# Patient Record
Sex: MALE | Race: BLACK OR AFRICAN AMERICAN | NOT HISPANIC OR LATINO | Employment: FULL TIME | URBAN - METROPOLITAN AREA
[De-identification: names, ages, dates, MRNs, and addresses within clinical notes are randomized per-mention and may not be internally consistent; named-entity substitution may affect disease eponyms.]

---

## 2024-02-22 ENCOUNTER — OFFICE VISIT (OUTPATIENT)
Dept: OTOLARYNGOLOGY | Facility: CLINIC | Age: 49
End: 2024-02-22
Payer: COMMERCIAL

## 2024-02-22 VITALS — BODY MASS INDEX: 33.05 KG/M2 | TEMPERATURE: 97.7 F | WEIGHT: 237 LBS

## 2024-02-22 DIAGNOSIS — R09.82 POST-NASAL DRAINAGE: ICD-10-CM

## 2024-02-22 DIAGNOSIS — R09.A2 GLOBUS SENSATION: ICD-10-CM

## 2024-02-22 DIAGNOSIS — K21.9 LARYNGOPHARYNGEAL REFLUX (LPR): Primary | ICD-10-CM

## 2024-02-22 PROCEDURE — 99204 OFFICE O/P NEW MOD 45 MIN: CPT | Performed by: NURSE PRACTITIONER

## 2024-02-22 PROCEDURE — 31505 DIAGNOSTIC LARYNGOSCOPY: CPT | Performed by: NURSE PRACTITIONER

## 2024-02-22 PROCEDURE — 1036F TOBACCO NON-USER: CPT | Performed by: NURSE PRACTITIONER

## 2024-02-22 ASSESSMENT — PATIENT HEALTH QUESTIONNAIRE - PHQ9
SUM OF ALL RESPONSES TO PHQ9 QUESTIONS 1 AND 2: 0
1. LITTLE INTEREST OR PLEASURE IN DOING THINGS: NOT AT ALL
2. FEELING DOWN, DEPRESSED OR HOPELESS: NOT AT ALL

## 2024-02-22 ASSESSMENT — ENCOUNTER SYMPTOMS
COUGH: 1
SORE THROAT: 1
ABDOMINAL PAIN: 0
TROUBLE SWALLOWING: 1

## 2024-02-23 ASSESSMENT — ENCOUNTER SYMPTOMS
SORE THROAT: 1
COUGH: 1
TROUBLE SWALLOWING: 1
ABDOMINAL PAIN: 0

## 2024-02-23 NOTE — PATIENT INSTRUCTIONS
Your flexible endoscopy showed that you have some swelling of the eustachian tubes and postnasal drainage.  There is also some evidence of reflux reaching up into the throat called LPR.  I put in a referral to gastroenterology for pH manometer.  Please follow-up in clinic in 8 weeks for review.

## 2024-02-23 NOTE — PROGRESS NOTES
Subjective   Patient ID: Meme Muniz is a 48 y.o. male who presents for Throat issues.  Sore Throat   This is a new problem. The current episode started 1 to 4 weeks ago. The problem has been unchanged. The pain is worse on the left side. There has been no fever. The fever has been present for 5 days or more. The pain is at a severity of 5/10. Associated symptoms include congestion, coughing, ear pain, a plugged ear sensation and trouble swallowing. Pertinent negatives include no abdominal pain. He has had no exposure to strep or mono.     Patient also states that he has had history of reflux in the past which treated with PPI and he responded well.  He has very good diet and knows what foods to avoid the triggers reflux.  He denies having any hemoptysis, trismus, dysphagia, odynophagia, unintended weight loss or weight gain.  He admits to having postnasal drainage that is bothersome as well as a globus sensation that is lingering with ear pressure.  No prior history of chronic ear infection, chronic rhinosinusitis,  ear surgery or trauma.  No family history of ear disease.  No complaints of tinnitus, dizziness or vertigo.    Review of Systems   HENT:  Positive for congestion, ear pain, sore throat and trouble swallowing.    Respiratory:  Positive for cough.    Gastrointestinal:  Negative for abdominal pain.       Objective   Physical Exam    CONSTITUTIONAL: No acute distress, normal facial features; No fever; no chills  VOICE: No hoarseness or other audible abnormality  RESPIRATION: Breathing comfortably, no stridor; normal breathing effort  CV: No cyanosis visible on the face and neck area  EYES:Pupils equal and round ; no erythema; conjunctiva clear; sclera white  NEURO: Alert and oriented, able to raise eyebrows symmetrical bilateral, smile with no facial droop, able to swallow  HEAD AND FACE: Symmetric facial features, no masses or lesions    Right ear examination: External ear normal.  EAC clear.  TM  intact with no effusion, retraction or perforation.  Left ear examination: External ear normal.  EAC clear.  TM intact with no effusion, retraction or perforation.    NOSE: External nose midline; inferior nasal turbinates mildly hypertrophied with clear nasal drainage no mucopus or polyps.  ORAL CAVITY: No lesions of external lips; uvula is midline; tongue with good mobility; no gross mass in oral cavity; mucosa appears pink ; no exudates, erythema or petechia of tonsils and oropharynx.  NECK/LYMPH: No obvious deformity or lesions; trachea is midline  PSYCH: Alert and oriented with appropriate mood and affect.    FOL  PROCEDURE NOTE:  Recommended flexible nasopharyngoscopy.  Risks, benefits, personnel and alternatives were explained.  The patient wished to proceed.  S/he was re-identified.  PROCEDURE:  Flexible nasopharyngoscopy  DIAGNOSIS: LPR, globus sensation and chronic sore throat    INDICATIONS: Inability to tolerate mirror exam  PROCEDURE NOTE:  Recommended flexible nasopharyngoscopy.  Risks, benefits, personnel and alternatives were explained.  The patient wished to proceed.  Patient was re-identified.  FINDINGS:  The nasopharynx and oropharynx were normal without any lesions or masses visualized.  No masses or lesions were visualized at the base of tongue, vallecula, epiglottis, aryepiglottic folds, pyriform sinuses, and lateral pharyngeal walls.  There is mild hypertrophy of the lingual tonsils with few cobblestoning of the posterior pharyngeal wall.  Noted torus tubarius bilaterally were inflamed but no obstruction of the eustachian tube meatus.  True vocal fold movement was normal no nodules or lesions.  The patient's airway was widely patent with no evidence of obstruction.  Patient tolerated the procedure well, and there were no complications.          Assessment/Plan       1. Laryngopharyngeal reflux (LPR)  Referral to Gastroenterology      2. Globus sensation        3. Post-nasal drainage           His clinical exam today showed mild to moderate LPR.  Per patient history he has been very good with his diet and nose what foods to avoid for reflux.  He did take PPI for short period of time which seem to help.  I put in a referral to gastroenterology for pH manometer to see if this patient can benefit for longer term treatment or we did just proceed with controlled diet and lifestyle management for reflux.    I did not see any evidence of infection in the throat or in the eustachian tube meatus.  He can benefit with using Flonase 2 sprays each nostril once daily for 8 weeks to help with the inflammation of the eustachian tubes.    He will follow-up with me in 2 months for reevaluation.    This note was created using speech recognition transcription software. Despite proofreading, several typographical errors might be present that might affect the meaning of the content. Please call with any questions.           NOVA Stein-CNP 02/23/24 11:17 AM

## 2024-03-12 ENCOUNTER — APPOINTMENT (OUTPATIENT)
Dept: GASTROENTEROLOGY | Facility: HOSPITAL | Age: 49
End: 2024-03-12
Payer: COMMERCIAL

## 2024-04-09 ENCOUNTER — APPOINTMENT (OUTPATIENT)
Dept: PRIMARY CARE | Facility: CLINIC | Age: 49
End: 2024-04-09
Payer: COMMERCIAL

## 2024-04-11 NOTE — PROGRESS NOTES
Meme Muniz is a 49 y.o. male with past medical history of sleep apnea, GERD, and cholecystectomy who is referred by [facility] for reflux. About 4 months ago he had possible virus, with pressure in head and ears and since then has had burning in chest and throat. This would come and go and eventually went away however his throat feels dry and his ear pressure has persisted. His typical GERD symptoms have overall been well controlled. Saw ENT who stated he may have silent reflux.     No nausea, vomiting, abdominal pain, poor appetite, or early satiety. He has been taking pantoprazole for 2 months with no improvement. There has been no change in bowels. He has daily bowel movements. No rectal bleeding, hematochezia, or melena. He has had 7 pound intentional weight loss with diet and exercise.    CBC from 6/2023 shows a microcytic anemia with Hgb 12 (MCV 70). Repeat labs 3/2024 are pretty much the same at Hgb 12.1, MCV 71. Iron normal. 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 He does not take NSAIDS regularly. No prior EGD. He had colonoscopy over 10 years ago. Had some polyps removed but told they were benign. Surgical history includes cholecystectomy.    Family history: Denies family history of colon cancer or other GI disorders or malignancy. Father had to see Hematologist as well for factor 7.     Past Medical History:   Diagnosis Date    Abnormal findings on diagnostic imaging of other specified body structures     Abnormal CT scan    Localized swelling, mass and lump, neck 11/08/2016    Mass of left side of neck    Personal history of other diseases of the digestive system 09/30/2015    History of acute cholecystitis    Personal history of other diseases of the digestive system 10/31/2016    History of gastroesophageal reflux (GERD)    Personal history of other diseases of the digestive system 11/30/2015    History of chronic cholecystitis     Past Surgical History:  "  Procedure Laterality Date    CHOLECYSTECTOMY  01/28/2016    Cholecystectomy Laparoscopic    EXCISION TUMOR NECK / THORAX  2018    mass excised from right side of neck     Current Outpatient Medications   Medication Sig Dispense Refill    cyclobenzaprine (Flexeril) 5 mg tablet Take 1 tablet (5 mg) by mouth 4 times a day as needed.      pantoprazole (ProtoNix) 40 mg EC tablet Take 1 tablet (40 mg) by mouth once daily.      polyethylene glycol-electrolytes (polyethylene glycol) 420 gram solution use as directed prior to colonoscopy      polyethylene glycol-electrolytes (GaviLyte-G) 420 gram solution Take 4,000 mL by mouth 1 time for 1 dose. 4000 mL 0     No current facility-administered medications for this visit.     No Known Allergies    Family History   Problem Relation Name Age of Onset    Heart attack Father         Review of Systems  Review of Systems negative except as noted in HPI.    Objective     /80   Pulse 84   Temp 36.5 °C (97.7 °F)   Resp 20   Ht 1.803 m (5' 11\")   Wt 103 kg (226 lb 14.4 oz)   SpO2 99%   BMI 31.65 kg/m²      Physical Exam  Constitutional:  No acute distress. Normal appearance. Not ill-appearing.  HENT:  Head normocephalic and atraumatic. Conjunctivae normal.  Cardiovascular:  Normal rate. Regular rhythm.  Pulmonary:  Pulmonary effort normal. No respiratory distress. Breath sounds clear.  Abdominal:  Abdomen is flat and soft. There is no distension. No tenderness or guarding.  Skin: Dry.  Neurological:  Alert and oriented.  Psychiatric:  Mood and affect normal.    Assessment/Plan     49 y.o. male with history of sleep apnea, GERD, and cholecystectomy who presents today for initial clinic visit for burning in chest/throat. This improved with PPI however he has a persistent microcytic anemia. 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 His anemia may be due to this however for completeness we discussed proceeding with EGD and " colonoscopy, as it has been 10 years or more since last colon cancer screening. Procedures ordered with anesthesia due to hx of sleep apnea.    Recommendations  Schedule EGD and colonoscopy. Golytely bowel prep sent to pharmacy.  Consider seeing Hematology for anemia.  Follow up to be determined after procedures.     Electronically signed by: Babita Roger CNP on 4/15/2024 at 3:15 PM

## 2024-04-15 ENCOUNTER — OFFICE VISIT (OUTPATIENT)
Dept: GASTROENTEROLOGY | Facility: HOSPITAL | Age: 49
End: 2024-04-15
Payer: COMMERCIAL

## 2024-04-15 VITALS
HEIGHT: 71 IN | SYSTOLIC BLOOD PRESSURE: 129 MMHG | RESPIRATION RATE: 20 BRPM | TEMPERATURE: 97.7 F | WEIGHT: 226.9 LBS | OXYGEN SATURATION: 99 % | DIASTOLIC BLOOD PRESSURE: 80 MMHG | BODY MASS INDEX: 31.77 KG/M2 | HEART RATE: 84 BPM

## 2024-04-15 DIAGNOSIS — K21.9 LARYNGOPHARYNGEAL REFLUX (LPR): Primary | ICD-10-CM

## 2024-04-15 DIAGNOSIS — D64.9 ANEMIA, UNSPECIFIED TYPE: ICD-10-CM

## 2024-04-15 DIAGNOSIS — Z86.010 HISTORY OF COLON POLYPS: ICD-10-CM

## 2024-04-15 PROCEDURE — 99204 OFFICE O/P NEW MOD 45 MIN: CPT | Performed by: NURSE PRACTITIONER

## 2024-04-15 PROCEDURE — 99214 OFFICE O/P EST MOD 30 MIN: CPT | Performed by: NURSE PRACTITIONER

## 2024-04-15 PROCEDURE — 1036F TOBACCO NON-USER: CPT | Performed by: NURSE PRACTITIONER

## 2024-04-15 RX ORDER — CYCLOBENZAPRINE HCL 5 MG
5 TABLET ORAL 4 TIMES DAILY PRN
COMMUNITY
Start: 2024-04-05

## 2024-04-15 RX ORDER — PANTOPRAZOLE SODIUM 40 MG/1
40 TABLET, DELAYED RELEASE ORAL DAILY
COMMUNITY
Start: 2024-04-05

## 2024-04-15 RX ORDER — POLYETHYLENE GLYCOL 3350, SODIUM SULFATE ANHYDROUS, SODIUM BICARBONATE, SODIUM CHLORIDE, POTASSIUM CHLORIDE 236; 22.74; 6.74; 5.86; 2.97 G/4L; G/4L; G/4L; G/4L; G/4L
POWDER, FOR SOLUTION ORAL
COMMUNITY
Start: 2024-03-07 | End: 2024-06-05

## 2024-04-15 RX ORDER — POLYETHYLENE GLYCOL-3350 AND ELECTROLYTES 236; 6.74; 5.86; 2.97; 22.74 G/274.31G; G/274.31G; G/274.31G; G/274.31G; G/274.31G
4000 POWDER, FOR SOLUTION ORAL ONCE
Qty: 4000 ML | Refills: 0 | Status: SHIPPED | OUTPATIENT
Start: 2024-04-15 | End: 2024-04-15

## 2024-04-15 ASSESSMENT — LIFESTYLE VARIABLES
HOW MANY STANDARD DRINKS CONTAINING ALCOHOL DO YOU HAVE ON A TYPICAL DAY: PATIENT DOES NOT DRINK
AUDIT-C TOTAL SCORE: 0
HOW OFTEN DO YOU HAVE SIX OR MORE DRINKS ON ONE OCCASION: NEVER
HOW OFTEN DO YOU HAVE A DRINK CONTAINING ALCOHOL: NEVER
SKIP TO QUESTIONS 9-10: 1

## 2024-04-15 ASSESSMENT — PAIN SCALES - GENERAL: PAINLEVEL: 0-NO PAIN

## 2024-04-15 NOTE — PATIENT INSTRUCTIONS
I suspect your anemia is due to your underlying factor 7 disorder, however due to your symptoms and history of polyps we should proceed with EGD and colonoscopy for completeness. I also recommend you follow up with Hematology.    Recommendations  Schedule EGD (upper endoscopy) and colonoscopy. You can call [phone] to schedule. Golytely bowel prep sent to pharmacy.  Consider seeing Hematology again for anemia.  Follow up to be determined after procedures. Please call the office at [phone] with any questions or concerns.

## 2024-04-25 NOTE — PROGRESS NOTES
"       [facility]  [facility]  [address]  [facility] [id number] [phone]     [facility]  New Visit Note      Subjective   Patient ID: Meme Muniz is a 49 y.o. male with past medical history significant for ****.     2024: The patient is here {pxarrival:06092}  for comprehensive sleep medicine evaluation due to {SleepCC:64872}. ESS: XIMENA:  , and neck circumference is  inches  today.    HPI  {OSAhx:91932}      SLEEP STUDY HISTORY: (personally reviewed raw data such as interpretation report, data sheet, hypnogram, and titration table if available and applicable)  ***    SLEEP-WAKE SCHEDULE  Bedtime: *** on weekdays, *** on weekends  Subjective sleep latency: ***  Problems falling asleep: ***  Number of awakenings: *** times per night spontaneously for unknown reasons  Falls back asleep in ***  Problems staying asleep: ***  Final wake time: *** on weekdays, *** on weekends  Out of bed time: *** on weekdays, *** on weekends  Shift work: ***  Naps: ***  Feels rested after a nap: {Yes/No:86984}  Average sleep duration (excluding naps): ***    SLEEP ENVIRONMENT  Sleep location: ***  Sleep status: {sleep status:31533}  Room is dark:  {Yes/No:79673::\"Yes\"}  Room is quiet: {Yes/No:22423::\"Yes\"}  Room is cool: {Yes/No:04261::\"Yes\"}  Bed comfort: good    SLEEP HABITS:   Activities before bedtime: ***  Activities in bed: ***  Preferred sleep position: {Sleep position:62337}    SLEEP ROS:  Night symptoms: {sleep apnea ROS at night:72384}  Morning symptoms: {sleep apnea ROS in mornin}  Daytime symptoms {sleep apnea ROS at daytime:44733}  Hypersomnia / narcolepsy symptoms: {narcolepsy ROS:39319}  RLS symptoms: {RLS symptoms:84369}  Movements in sleep: {sleep movements:53357}  Parasomnia symptoms: {parasomnia ROS:41647}    WEIGHT: {weight:00053}    REVIEW OF SYSTEMS: All other systems have been reviewed " and are negative.    PERTINENT SOCIAL HISTORY:  Occupation: employment status:86200}  Smoking: {Yes/No:94761}  ETOH: {Yes/No:63818}  Marijuana: {Yes/No:59786}  Caffeine: ***  Sleep aids: {Yes/No:54484}  Claustrophobia: {Yes/No:31071}    PERTINENT PAST SURGICAL HISTORY:  {surgical history pertinent in sleep:74165}    PERTINENT FAMILY HISTORY:  {family history in sleep:96665}    Active Problems, Allergy List, Medication List, and PMH/PSH/FH/Social Hx have been reviewed and reconciled in chart. No significant changes unless documented in the pertinent chart section. Updates made when necessary.       Objective     Physical Exam  Constitutional:alert and oriented to time, place, and person  Sinus: *** tenderness to palpation  Palate: Normal / Narrow / High-arched / *** torus palatini  Mallampati ***, *** Tongue scalloping, *** macroglossia  Lungs: Clear to auscultation bilateral, no rales  Heart: Regular rate and rhythm, no murmurs    Assessment/Plan   Meme Muniz is a 49 y.o. male who is seen to evaluate for ***possible/severe/ moderate/mild obstructive sleep apnea. The pathophysiology of sleep apnea, diagnostic testing (HST vs PSG), treatment options (PAP, oral appliance, surgery, hypoglossal nerve stimulator called Inspire), and supportive management (weight loss, positional therapy, smoking cessation, avoidance of alcohol and sedatives) were discussed with the patient in detail. Risk factors of sleep apnea as well as cardiometabolic and neurocognitive sequelae associated with untreated sleep apnea were also discussed. Lastly, patient was advised to avoid driving vehicle or operating heavy machinery when sleepy.     Meme Muniz with the following problems:     # SLEEP DISORDERED BREATHING:  -This is likely sleep apnea based on the the history and physical examination.   -Meme Abreus not yet had a sleep study.  -Instruct patient to complete home/ in lab/ split night/ titration sleep study.  -HSAT is  reasonable as patient likely has YAIMA based on history and exam and does not have any of the following comorbidities: CHF, neuromuscular weakness, hypoventilation, or significant COPD.  -We consider treatment as indicated when testing is complete.     # SLEEP APNEA:  -Per CMS criteria, the patient is not eligible for a pap to treat her Obstructive sleep apnea.   -Start/ Continue [] cmH20 []PAP through [employer].  -Sleep apnea and PAP therapy education were provided at length in the clinic today. Meme Muniz verbalized understanding.  -Emphasized diet, exercise, and weight loss in the clinic, as were non-supine sleep, avoiding alcohol in the late evening, and driving or operating heavy machinery when sleepy.  Meme Munizverbalizes understanding of the above instructions and risks.    # BARIATRIC SURGERY INSTRUCTION:  -Please continuous using your CPAP to treat your sleep apnea.  -Bring your PAP and all equipment with you to surgery.  -Use your PAP with surgery and during recovery.  -Keep your head of the bed at 30* or higher.  -I advise judicious use of pain medications post operatively as pain medications can make sleep apnea worse.    -I advise close monitoring of the patient post operatively due to increased risk of complications related to sleep apnea.   -Meme Muniz may be at higher risk of postoperative respiratory complications.Valentinealexei Muniz understands the risk of post operative complications are higher for Meme Muniz is at increased but not prohibitive risk due to YAIMA.   -Meme Muniz is optimized on PAP therapy for sleep apnea as long as Meme Muniz is compliant with PAP use per most recent download.  -Meme Muniz verbalizes understanding of the above instructions and risks.     # CHRONIC SLEEP ONSET/ SLEEP MAINTENANCE INSOMNIA:  -likely due to poor sleep hygiene, irregular sleep schedule, depression, anxiety, untreated sleep apnea, nocturia, RLS,  delayed sleep phase syndrome, and chronic pain. [Meds] may be a contributing factor as well.  -XIMENA:    # DEPRESSION and ANXIETY:   -Meme Munizis taking [] and participating in psychotherapy.  -Denies HI/SI     # HYPERTENSION/ ATRIAL FIBRILLATION/ CAD/ CHF:  -Blood pressure was controlled today. To control her blood pressure better, instruct the patient to take anti-hypertension medication at night and a water pill in the morning.  -Denies headache, palpitation, and syncope in the clinic.  -Last Echo:  -Follows with PCP/ Cardiology     # MORBID OBESITY/OBESITY /OVERWEIGHT:  -with a BMI of []. Meme Muniz most recent Bicarbonate was   Bicarbonate   Date Value Ref Range Status   01/07/2022 24 21 - 32 mmol/L Final      -Encourage to have regular exercise to manage weight well.  -Refer to a nutritionist for weight control.  -Bariatric surgery candidate.    # NASAL CONGESTION:  -Instruct Meme Maier use appropriate Flonase spray to ease congestion.  -Refer to Otolaryngology for underlying investigation.    # XEROSTOMIA:  -Instruct Meme Maier purchase the Biotene gel to ease the dry mouth symptom,     # TOBACCO ABUSE:Meme Muniz is a current [] PPD smoker for [] years.  -Smoking Cessation given  -Decline Smoking Cessation     # RESTLESS LEG SYNDROME: This occurs frequently.  Iron (ug/dL)   Date Value   12/03/2021 44     Iron Saturation (%)   Date Value   12/03/2021 21 (L)     TIBC (ug/dL)   Date Value   12/03/2021 210 (L)     Ferritin (ug/L)   Date Value   12/03/2021 2,608 (H)     We will check a ferritin level today and start OTC iron replacement to ferritin of >75 as indicated.  Caffeine []. Eliminate  Tobacco []. Smoking cessation counseling provided.  Meme Darrius I discussed Meme Munizcurrent medications that could be exacerbating Meme Muniz symptoms. Will continue [] for now.  Associated diseases [] and response  [].  Pramipexole  Ropinirole  Rotigitine  Gabapentin  Pregabalin  Opioids  Benzos     # CIRCADIAN RHYTHM SLEEP-WAKE DISORDER:  -We will obtain sleep logs for two weeks to be brought to next clinic to discuss. We will consider actigraphy to compare and/or confirm sleep log findings.  Delayed Sleep Phase:   -Set wake time, light therapy in the morning.   -Sleep hygiene measures at set bedtime.  Advanced Sleep Phase:   -Set bedtime and light therapy in the evening.   -Sleep hygiene measures at set bedtime.  Irregular Sleep Phase:  -Set bedtime and wake time.   -Daytime routine including scheduled physical activity, social activity and meal times.  Non-24 Sleep-Wake Rhythm:  -Set bedtime and wake time.   -Daytime routine including scheduled physical activity, social activity and meal times.  Shift Work:  -Maximize sleep time to 7-8 hours.   -Make sure your room is dark, quiet, cool and interruptions are eliminated.   -Avoid light in the mornings, wear dark sunglasses driving home.   -Expose yourself to bright light or daylight upon waking.   -Avoid caffeine 8 hours prior to sleeping.   -We will consider modafinil, a mild stimulant, if these strategies are not effective.  Jet Lag:  -Try to change your sleep/wake schedule two to three days prior to travel.   -Expose yourself to bright light when you want to be awake.   -Avoid bright light and electronic light when you are nearing time to sleep.   -You can take melatonin OTC as needed 1 hour prior to bedtime as needed.     # SLEEPWALKING/ SLEEP EATING/ REM BEHAVIOR DISORDER:  -Instruct Meme Muniz to make sure self and bed partner are safe.  -Instruct Meme Maier lock bedroom doors and windows.  -Instruct Meme Maier hide his/her car keys.  -Instruct Meme Maier pad headboard and move furniture away from the bed.  -Instruct Meme Muniz to put pillows in between him/her with bed partner if kicking or hitting. Consider sleeping  separately.  -Instruct Meme Muniz to remove clutter and furniture from bedroom floor to avoid injury. Consider moving Meme Muniz mattress to the floor.  -Instruct Meme Muniz to advise bed partner to calmly lead you back to bed with a gentle voice. Avoid touching and grabbing.  -Instruct Meme Muniz to  find evidence of sleep eating, especially if he/she is gaining weight, consider locking Meme Muniz fridge and pantry at night.  -We will consider a trial of low dose clonazepam 0.5 mg nightly.     #IDIOPATHIC HYPERSOMNIA VS. NARCOLEPSY:  - Will order an overnight sleep study, followed by MSLT the next day  - Perform urine toxicology prior to sleep study.  - Will call patient within 3 weeks after the test. Will discuss follow up plan at that time.  - May consider checking TSH and iron studies to rule out underlying metabolic etiologies.  -Scheduled naps  -Consolidate night time sleep.     *An OARRS report was reviewed and is consistent with prescribed medications.   *Considered the risks of abuse, dependence, addiction, and diversion and [] medication is felt to be clinically appropriate based on documented diagnosis.  *A controlled substance agreement was reviewed and signed today in clinic.   *Pending scanned copy in to the chart per office staff.    RTC      All of patient's questions were answered. He verbalizes understanding and agreement with my assessment and plan.

## 2024-05-01 ENCOUNTER — APPOINTMENT (OUTPATIENT)
Dept: SLEEP MEDICINE | Facility: HOSPITAL | Age: 49
End: 2024-05-01
Payer: COMMERCIAL

## 2024-05-23 ENCOUNTER — APPOINTMENT (OUTPATIENT)
Dept: PRIMARY CARE | Facility: CLINIC | Age: 49
End: 2024-05-23
Payer: COMMERCIAL

## 2024-05-23 ENCOUNTER — APPOINTMENT (OUTPATIENT)
Dept: OTOLARYNGOLOGY | Facility: CLINIC | Age: 49
End: 2024-05-23
Payer: COMMERCIAL

## 2024-06-06 ENCOUNTER — OFFICE VISIT (OUTPATIENT)
Dept: PRIMARY CARE | Facility: CLINIC | Age: 49
End: 2024-06-06
Payer: COMMERCIAL

## 2024-06-06 ENCOUNTER — LAB (OUTPATIENT)
Dept: LAB | Facility: LAB | Age: 49
End: 2024-06-06
Payer: COMMERCIAL

## 2024-06-06 VITALS — SYSTOLIC BLOOD PRESSURE: 110 MMHG | DIASTOLIC BLOOD PRESSURE: 84 MMHG | HEART RATE: 94 BPM

## 2024-06-06 DIAGNOSIS — R20.2 PARESTHESIAS: ICD-10-CM

## 2024-06-06 DIAGNOSIS — D56.3 ALPHA THALASSEMIA MINOR: ICD-10-CM

## 2024-06-06 DIAGNOSIS — R20.2 PARESTHESIAS: Primary | ICD-10-CM

## 2024-06-06 DIAGNOSIS — G47.33 OSA (OBSTRUCTIVE SLEEP APNEA): ICD-10-CM

## 2024-06-06 DIAGNOSIS — R73.03 PREDIABETES: ICD-10-CM

## 2024-06-06 DIAGNOSIS — K21.9 GASTROESOPHAGEAL REFLUX DISEASE WITHOUT ESOPHAGITIS: ICD-10-CM

## 2024-06-06 DIAGNOSIS — I10 ESSENTIAL HYPERTENSION: ICD-10-CM

## 2024-06-06 PROBLEM — K63.5 COLON POLYP: Status: ACTIVE | Noted: 2024-03-07

## 2024-06-06 LAB
25(OH)D3 SERPL-MCNC: 55 NG/ML (ref 30–100)
ALBUMIN SERPL BCP-MCNC: 4.6 G/DL (ref 3.4–5)
ALP SERPL-CCNC: 91 U/L (ref 33–120)
ALT SERPL W P-5'-P-CCNC: 19 U/L (ref 10–52)
ANION GAP SERPL CALC-SCNC: 15 MMOL/L (ref 10–20)
AST SERPL W P-5'-P-CCNC: 18 U/L (ref 9–39)
BASOPHILS # BLD AUTO: 0.02 X10*3/UL (ref 0–0.1)
BASOPHILS NFR BLD AUTO: 0.4 %
BILIRUB SERPL-MCNC: 0.6 MG/DL (ref 0–1.2)
BUN SERPL-MCNC: 17 MG/DL (ref 6–23)
CALCIUM SERPL-MCNC: 9.3 MG/DL (ref 8.6–10.6)
CHLORIDE SERPL-SCNC: 104 MMOL/L (ref 98–107)
CHOLEST SERPL-MCNC: 193 MG/DL (ref 0–199)
CHOLESTEROL/HDL RATIO: 4.5
CO2 SERPL-SCNC: 23 MMOL/L (ref 21–32)
CREAT SERPL-MCNC: 1.25 MG/DL (ref 0.5–1.3)
CRP SERPL HS-MCNC: 3.1 MG/L
EGFRCR SERPLBLD CKD-EPI 2021: 71 ML/MIN/1.73M*2
EOSINOPHIL # BLD AUTO: 0.02 X10*3/UL (ref 0–0.7)
EOSINOPHIL NFR BLD AUTO: 0.4 %
ERYTHROCYTE [DISTWIDTH] IN BLOOD BY AUTOMATED COUNT: 17.5 % (ref 11.5–14.5)
EST. AVERAGE GLUCOSE BLD GHB EST-MCNC: 128 MG/DL
GLUCOSE SERPL-MCNC: 99 MG/DL (ref 74–99)
HBA1C MFR BLD: 6.1 %
HCT VFR BLD AUTO: 40.4 % (ref 41–52)
HDLC SERPL-MCNC: 43.3 MG/DL
HGB BLD-MCNC: 13 G/DL (ref 13.5–17.5)
IMM GRANULOCYTES # BLD AUTO: 0.01 X10*3/UL (ref 0–0.7)
IMM GRANULOCYTES NFR BLD AUTO: 0.2 % (ref 0–0.9)
LDLC SERPL CALC-MCNC: 132 MG/DL
LYMPHOCYTES # BLD AUTO: 2.42 X10*3/UL (ref 1.2–4.8)
LYMPHOCYTES NFR BLD AUTO: 53 %
MCH RBC QN AUTO: 22.5 PG (ref 26–34)
MCHC RBC AUTO-ENTMCNC: 32.2 G/DL (ref 32–36)
MCV RBC AUTO: 70 FL (ref 80–100)
MONOCYTES # BLD AUTO: 0.4 X10*3/UL (ref 0.1–1)
MONOCYTES NFR BLD AUTO: 8.8 %
NEUTROPHILS # BLD AUTO: 1.7 X10*3/UL (ref 1.2–7.7)
NEUTROPHILS NFR BLD AUTO: 37.2 %
NON HDL CHOLESTEROL: 150 MG/DL (ref 0–149)
NRBC BLD-RTO: 0 /100 WBCS (ref 0–0)
PLATELET # BLD AUTO: 275 X10*3/UL (ref 150–450)
POTASSIUM SERPL-SCNC: 4.2 MMOL/L (ref 3.5–5.3)
PROT SERPL-MCNC: 7.5 G/DL (ref 6.4–8.2)
PROT SERPL-MCNC: 7.5 G/DL (ref 6.4–8.2)
RBC # BLD AUTO: 5.79 X10*6/UL (ref 4.5–5.9)
SODIUM SERPL-SCNC: 138 MMOL/L (ref 136–145)
TRIGL SERPL-MCNC: 87 MG/DL (ref 0–149)
TSH SERPL-ACNC: 1.52 MIU/L (ref 0.44–3.98)
VIT B12 SERPL-MCNC: 1312 PG/ML (ref 211–911)
VLDL: 17 MG/DL (ref 0–40)
WBC # BLD AUTO: 4.6 X10*3/UL (ref 4.4–11.3)

## 2024-06-06 PROCEDURE — 83036 HEMOGLOBIN GLYCOSYLATED A1C: CPT

## 2024-06-06 PROCEDURE — 80061 LIPID PANEL: CPT

## 2024-06-06 PROCEDURE — 86141 C-REACTIVE PROTEIN HS: CPT

## 2024-06-06 PROCEDURE — 3074F SYST BP LT 130 MM HG: CPT | Performed by: INTERNAL MEDICINE

## 2024-06-06 PROCEDURE — 99203 OFFICE O/P NEW LOW 30 MIN: CPT | Performed by: INTERNAL MEDICINE

## 2024-06-06 PROCEDURE — 80053 COMPREHEN METABOLIC PANEL: CPT

## 2024-06-06 PROCEDURE — 36415 COLL VENOUS BLD VENIPUNCTURE: CPT

## 2024-06-06 PROCEDURE — 84443 ASSAY THYROID STIM HORMONE: CPT

## 2024-06-06 PROCEDURE — 3079F DIAST BP 80-89 MM HG: CPT | Performed by: INTERNAL MEDICINE

## 2024-06-06 PROCEDURE — 85025 COMPLETE CBC W/AUTO DIFF WBC: CPT

## 2024-06-06 PROCEDURE — 84155 ASSAY OF PROTEIN SERUM: CPT

## 2024-06-06 PROCEDURE — 82306 VITAMIN D 25 HYDROXY: CPT

## 2024-06-06 PROCEDURE — 84165 PROTEIN E-PHORESIS SERUM: CPT

## 2024-06-06 PROCEDURE — 82607 VITAMIN B-12: CPT

## 2024-06-06 NOTE — PROGRESS NOTES
Subjective   Patient ID: Meme Muniz is a 49 y.o. male who presents to establish PCP    HPI   The patient reports a 3-week history of constant burning sensation originating in the toes extending the proximal ends of the lower legs bilaterally.  He reports similar constant burning sensation at the distal ends of the fingers as well.  He reports that he has experienced intermittent episodes of a burning sensation in both buttocks and over the neck.  He reports no associated neck pain, lower back pain, upper or lower extremity weakness.    The patient's past medical history is remarkable for hypertension  COVID-19 x 2 the first episode requiring a hospitalization in November 2021  Allergic rhinitis  Gastroesophageal reflux  Status postcholecystectomy  Vitamin D deficiency  Prediabetes  Thalassemia  Cervical spinal stenosis at C4-C5  Obstructive sleep apnea  Review of Systems    Objective   There were no vitals taken for this visit.    Physical Exam  Neurologic  Mental status-alert and oriented x3   Cranial nerves-2 through 12 grossly intact, no visual field abnormalities  Motor-no pronator drift noted, strength-5/5 in all muscle groups tested, , no tremor noted.  No bradykinesia noted.  No rigidity noted.  Negative pull test  Sensory-Light touch sensation fully intact  Pinprick sensation fully intact  Vibratory sensation fully intact  Cerebellar-no truncal ataxia, good coordination finger-nose testing,, good coordination heel-to-shin testing, normal rapid alternating movements  Romberg negative, no abnormality in tandem gait  Reflexes-1+/4 bilaterally  Assessment/Plan        Assessment  Constant burning sensation originating in the toes extending to the proximal ends of the lower legs bilaterally and at the distal ends of the fingers-May be secondary to peripheral neuropathy-probably secondary to type 2 diabetes mellitus.  Bilateral cervical radiculopathies, bilateral lumbar radiculopathies are possible etiologies  as well but are much less likely  Plan  Obtain CBC differential, CMP, fasting lipid profile, hemoglobin A1c, TSH, vitamin D level, vitamin B12 level, cardiac CRP, serum immunoelectrophoresis today.  Arrange for EMG of the upper and lower extremities as soon as possible  At this time, the patient does not wish to begin use of a pain modulator such as duloxetine, alpha lipoic acid

## 2024-06-10 LAB
ALBUMIN: 4.5 G/DL (ref 3.4–5)
ALPHA 1 GLOBULIN: 0.2 G/DL (ref 0.2–0.6)
ALPHA 2 GLOBULIN: 0.5 G/DL (ref 0.4–1.1)
BETA GLOBULIN: 1 G/DL (ref 0.5–1.2)
GAMMA GLOBULIN: 1.3 G/DL (ref 0.5–1.4)
PATH REVIEW-SERUM PROTEIN ELECTROPHORESIS: NORMAL
PROTEIN ELECTROPHORESIS COMMENT: NORMAL

## 2024-06-24 ENCOUNTER — APPOINTMENT (OUTPATIENT)
Dept: PRIMARY CARE | Facility: CLINIC | Age: 49
End: 2024-06-24
Payer: COMMERCIAL

## 2024-06-24 ENCOUNTER — HOSPITAL ENCOUNTER (OUTPATIENT)
Dept: NEUROLOGY | Facility: CLINIC | Age: 49
Discharge: HOME | End: 2024-06-24
Payer: COMMERCIAL

## 2024-06-24 DIAGNOSIS — R20.2 PARESTHESIAS: ICD-10-CM

## 2024-06-24 PROCEDURE — 95886 MUSC TEST DONE W/N TEST COMP: CPT | Performed by: PSYCHIATRY & NEUROLOGY

## 2024-06-24 PROCEDURE — 95912 NRV CNDJ TEST 11-12 STUDIES: CPT | Performed by: PSYCHIATRY & NEUROLOGY

## 2024-08-14 ENCOUNTER — TELEPHONE (OUTPATIENT)
Dept: PRIMARY CARE | Facility: CLINIC | Age: 49
End: 2024-08-14
Payer: COMMERCIAL

## 2024-08-14 NOTE — TELEPHONE ENCOUNTER
Left message on Cookie's voicemail to give me a call back to clarify as she did state patient has an upcoming appointment. Also left our fax number on her voicemail just in case she didn't have the correct fax number and needs to refax referral.

## 2024-08-19 DIAGNOSIS — R20.2 PARESTHESIAS: Primary | ICD-10-CM

## 2024-08-19 RX ORDER — DULOXETIN HYDROCHLORIDE 30 MG/1
30 CAPSULE, DELAYED RELEASE ORAL DAILY
Qty: 30 CAPSULE | Refills: 11 | Status: SHIPPED | OUTPATIENT
Start: 2024-08-19 | End: 2025-08-19

## 2024-08-19 NOTE — PROGRESS NOTES
Patient continues to complain of diffuse burning in the arms and legs.  I have again refer the patient to neurology for further evaluation and treatment but I have prescribed duloxetine at a dose of 30 mg daily.  He will contact me in 3 weeks with his condition

## 2024-11-20 ENCOUNTER — OFFICE VISIT (OUTPATIENT)
Dept: NEUROLOGY | Facility: HOSPITAL | Age: 49
End: 2024-11-20
Payer: COMMERCIAL

## 2024-11-20 VITALS
DIASTOLIC BLOOD PRESSURE: 116 MMHG | WEIGHT: 233 LBS | RESPIRATION RATE: 18 BRPM | BODY MASS INDEX: 32.62 KG/M2 | SYSTOLIC BLOOD PRESSURE: 163 MMHG | HEIGHT: 71 IN | TEMPERATURE: 97.3 F | HEART RATE: 130 BPM

## 2024-11-20 DIAGNOSIS — G62.9 SMALL FIBER NEUROPATHY: Primary | ICD-10-CM

## 2024-11-20 DIAGNOSIS — R20.2 PARESTHESIAS: ICD-10-CM

## 2024-11-20 PROCEDURE — 3077F SYST BP >= 140 MM HG: CPT | Performed by: PSYCHIATRY & NEUROLOGY

## 2024-11-20 PROCEDURE — 99215 OFFICE O/P EST HI 40 MIN: CPT | Mod: GC | Performed by: PSYCHIATRY & NEUROLOGY

## 2024-11-20 PROCEDURE — 3008F BODY MASS INDEX DOCD: CPT | Performed by: PSYCHIATRY & NEUROLOGY

## 2024-11-20 PROCEDURE — 99215 OFFICE O/P EST HI 40 MIN: CPT | Performed by: PSYCHIATRY & NEUROLOGY

## 2024-11-20 PROCEDURE — 3080F DIAST BP >= 90 MM HG: CPT | Performed by: PSYCHIATRY & NEUROLOGY

## 2024-11-20 RX ORDER — GABAPENTIN 100 MG/1
100 CAPSULE ORAL 3 TIMES DAILY
Qty: 90 CAPSULE | Refills: 11 | Status: SHIPPED | OUTPATIENT
Start: 2024-11-20 | End: 2025-11-20

## 2024-11-20 ASSESSMENT — PATIENT HEALTH QUESTIONNAIRE - PHQ9
1. LITTLE INTEREST OR PLEASURE IN DOING THINGS: NOT AT ALL
SUM OF ALL RESPONSES TO PHQ9 QUESTIONS 1 AND 2: 0
2. FEELING DOWN, DEPRESSED OR HOPELESS: NOT AT ALL

## 2024-11-20 ASSESSMENT — PAIN SCALES - GENERAL: PAINLEVEL_OUTOF10: 7

## 2024-11-20 NOTE — PROGRESS NOTES
Neuromuscular Medicine Consult     Meme Muniz, MRN: [id number], : 1975  Reason for Referral: No chief complaint on file.     Referring Provider: Neftaly Vanessa MD  Primary Care Physician: Neftaly Vanessa MD     Impression/Plan:   Meme Muniz is a 50 yo male with recently found prediabetes, who presents with 5 months of ascending burning and paresthesia from feet up to neck. The abnormal sensation is persistent in his legs below the knees as well as the neck, and intermittent in the rest of the body. Yesterday he noticed frostbite sensation in the right side of his face which went away. Around the time when the burning started, he noticed erectile dysfunction. No other symptoms suggesting dysautonomia. No significant sicca symptoms. He does not drink alcohol regularly, does not smoke, no known toxic exposure, no known cardiac issue.     His current exam is only positive for decreased temperature sensation in left hand in a glove distribution, and decreased temperature sensation in bilateral feet below ankles. The rest of the exam findings are normal. His presentation and exam findings can best localize to small fibers in the peripheral nervous system.     Possible causes of small fiber neuropathy include diabetes/glucose intolerance, vitamin excess (eg, B6) or deficiency (eg, B12), thyroid dysfunction, infection (eg, HIV, HBV, syphillis), autoimmune/inflammatory conditions (eg, sjogren's disease, sarcoidosis, etc), celiac disease, paraneoplastic conditions (eg, anti Hu), amyloidosis, cryoglobulinemia. Genetic conditions would be less likely in his case.  His current testing showed good B12 level and normal TSH, A1c is mildly elevated (6.1). His prior CRP was elevated but not sure in what kind of health condition. His prior EMG showed no large fiber neuropathy, which would be within our expectation in cases of small fiber neuropathy. His prior MRI cervical spine at [facility] showed some degenerative changes  with C3-C4 foraminal narrowing, we have low suspicion that his symptom would be a manifestation of cord involvement currently.     Will get more labs to evaluate causes of small fiber neuropathy today, and obtain autonomic testing. Will start low-dose gabapentin after the autonomic testing    Impression: small fiber neuropathy    Plan:  - autonomic testing ordered and scheduled  - start gabapentin 100mg TID after the autonomic testing  - obtain labs as listed below  Problem List Items Addressed This Visit       Paresthesias    Relevant Medications    gabapentin (Neurontin) 100 mg capsule    Other Relevant Orders    Vitamin B6    Serum Protein Electrophoresis + Immunofixation    Deepstep/Lambda Free Light Chain, Serum    KATARZYNA + QUITA Panel    Syphilis Screen with Reflex    HIV 1/2 Antigen/Antibody Screen with Reflex to Confirmation    Hepatitis B Core Antibody, IgM    Hepatitis B Surface Antibody    Hepatitis B Surface Antigen    Hepatitis C Antibody    Cryoglobulin Screen with reflex to Profile    Celiac Panel    Alpha-Galactosidase, Blood    Autonomic Testing    Heavy Metals, Blood    Paraneoplastic Autoantibodies Evaluation    Folate     Other Visit Diagnoses       Small fiber neuropathy    -  Primary    Relevant Medications    gabapentin (Neurontin) 100 mg capsule    Other Relevant Orders    Vitamin B6    Serum Protein Electrophoresis + Immunofixation    Deepstep/Lambda Free Light Chain, Serum    KATARZYNA + QUITA Panel    Syphilis Screen with Reflex    HIV 1/2 Antigen/Antibody Screen with Reflex to Confirmation    Hepatitis B Core Antibody, IgM    Hepatitis B Surface Antibody    Hepatitis B Surface Antigen    Hepatitis C Antibody    Cryoglobulin Screen with reflex to Profile    Celiac Panel    Alpha-Galactosidase, Blood    Autonomic Testing    Heavy Metals, Blood    Paraneoplastic Autoantibodies Evaluation    Folate          Follow up after autonomic testing, we will call the patient.         LUIS Day  Neuromuscular  Fellow      History of Present Illness:    Mr. Muniz is a 49 y.o. male who presents for evaluation of burning sensation in the body. He presents alone today.     5 months ago, he started to have burning sensation in feet bilaterally at the same time, it keeps ascending, in the stocking distribution, it progressed to his buttocks over several months. Then it progressed to his hands, ascending to his neck now. Shortly after the onset of burning, he noticed numbness in the same area. The abnormal sensation stays in his legs and neck all the time, comes and goes in the rest part of his body. He has intermittent burning sensation in his chest and back as well. Yesterday he noticed burning/pins and needles in the right side of his head that lasted for a short period of time. The burning sensation sometimes fees like a frostbite with tingling feeling, very debilitating for him. He has stopped working out due to the pain. No weakness. No balance problem. No bulbar symptom. No ocular symptom. No difficulty with breathing.     Around the time when he started having burning sensation, he noticed erectile dysfunction. No orthostatic lightheadedness. No dry eyes, dry mouth or dry skin. Sweating is normal. No nausea/vomiting after eat. No diarrhea or constipation. No urinary urgency, frequency, incontinence of retention. No muscle pain, joint pain.     He drinks occasionally, one beer a time, last drink was 7 months ago. He was found to have elevated A1c 8 months ago, was 6.3, decreased to 6.1 5 months ago. He never smoked.     He eats everything but tries to avoid sugars. He eats a lot of protein, eats fruits and vegetables. He does not drink energy drinks or protein shakes but takes supplements for workout (he is not sure about the name, thinks it contains testosterone). He stopped all the supplements for 2 weeks and it did not help with his symptoms.     He does not have known cardiac condition. His father had a bypass  surgery at age of 77. No sudden cardiac death in family.     He has C3-4 foraminal stenosis, which was shown on MRI about 7 years ago at the [facility]. He does not have radicular pain from neck to arm. He went to chiropractor to have decompression manipulation since he thought this could be the cause, no benefit.  He tried duloxetine for 30 days, had nausea, dizziness, palpitation and diaphoresis from it, so he stopped this.  He has not tried other medication for pain.       Relevant past medical, surgical, family, and social histories, along with ROS was reviewed and pertinent details noted above.     Past Medical History:   Diagnosis Date    Abnormal findings on diagnostic imaging of other specified body structures     Abnormal CT scan    Localized swelling, mass and lump, neck 2016    Mass of left side of neck    Personal history of other diseases of the digestive system 2015    History of acute cholecystitis    Personal history of other diseases of the digestive system 10/31/2016    History of gastroesophageal reflux (GERD)    Personal history of other diseases of the digestive system 2015    History of chronic cholecystitis     Past Surgical History:   Procedure Laterality Date    CHOLECYSTECTOMY  2016    Cholecystectomy Laparoscopic    EXCISION TUMOR NECK / THORAX  2018    mass excised from right side of neck     Family History   Problem Relation Name Age of Onset    Heart attack Father     Grand mother  of ovarian cancer.       Social History     Tobacco Use    Smoking status: Never     Passive exposure: Never    Smokeless tobacco: Never   Substance Use Topics    Alcohol use: Not Currently      No Known Allergies     Medications:    Current Outpatient Medications:     cyclobenzaprine (Flexeril) 5 mg tablet, Take 1 tablet (5 mg) by mouth 4 times a day as needed., Disp: , Rfl:     DULoxetine (Cymbalta) 30 mg DR capsule, Take 1 capsule (30 mg) by mouth once daily. Do not crush  "or chew., Disp: 30 capsule, Rfl: 11    pantoprazole (ProtoNix) 40 mg EC tablet, Take 1 tablet (40 mg) by mouth once daily., Disp: , Rfl:     gabapentin (Neurontin) 100 mg capsule, Take 1 capsule (100 mg) by mouth 3 times a day., Disp: 90 capsule, Rfl: 11       Physical Exam:   BP (!) 163/116   Pulse (!) 130   Temp 36.3 °C (97.3 °F)   Resp 18   Ht 1.803 m (5' 11\")   Wt 106 kg (233 lb)   BMI 32.50 kg/m²      General Appearance:  No distress, alert, interactive and cooperative.   Skin: No rash present on limbs or extensor surfaces  Musculoskeletal:  No scoliosis, lordosis, kyphosis, pes cavus, or hammertoes    Neurological:  Mental status: the patient provided an accurate history, language was normal.   Cranial Nerves:  CN 2   Visual fields full to confrontation.   CN 3, 4, 6   Pupils round, 3 mm in diameter, equally reactive to light.   Lids symmetric; no ptosis.   EOMs normal alignment, full range, with normal pursuit and convergence  No nystagmus.   CN 5   Facial sensation intact bilaterally.   Jaw opening 5/5   CN 7   Normal and symmetric facial strength. Nasolabial folds symmetric.   Able to lift eyebrows and close eye lids with eye lashes buried symmetrically.   CN 8   Hearing intact to conversation.     CN 9, 10   Palate elevates symmetrically.   Phonation within normal limits, no dysarthria.   CN 11   Normal strength of shoulder shrug and neck turning.   CN 12   Tongue midline, with normal bulk and strength; no fasciculations.     Motor:   Muscle bulk: Normal throughout.  Muscle tone: Normal in both upper and lower extremities.  Movements: No fasciculations, tremors, or other abnormal movement.     Manual Muscle Testing (MMT) reveals the following MRC grades:  Neck Flexion 5  Neck Extension 5  R L   Shoulder abduction  5 5  Elbow flexion   5 5  Elbow extension  5 5  Finger flexion   5 5  Finger extension  5 5  Finger abduction  5 5  Thumb abduction   5 5  Hip flexion   5 5  Hip extension   5 5  Hip " "abduction    5 5  Hip adduction    5 5  Knee flexion   5 5  Knee extension  5 5  Ankle dorsiflexion  5 5  Ankle plantarflexion  5 5  Big toe extension  5 5  Toe flexion   5 5    Reflexes:     R          L  BR:               2          2  Biceps:         2          2  Triceps:        2          2  Knee:           3          3  Ankle:          1          1     Babinski: Toes are down going  Duncan's: Not present  No clonus or other pathological reflexes    Sensory:   Decreased temperature sensation in left hand (glove distribution) and bilateral feet below ankle levels.   Intact sensation to light touch, pinprick throughout. There is asymmetry of vibration sensation in toes, can feel 14s on the left and 21s on the right, but reports there's no difference of intensity, no gradient change. Intact position sensation in toes.     Coordination:    In both upper extremities, finger-nose-finger was intact without dysmetria or overshoot.   JESSICA were intact in both upper and lower extremities.      Gait:   Station was stable with a normal base. Gait was stable with a normal arm swing and speed. No ataxia, shuffling, steppage or waddling was present. No circumduction was present.        Results:     The following labs, imaging, and/or data were personally reviewed and demonstrated:     EMG 6/24/2024: reviewed, normal study.     Lab 6/6/2024:   B12 1312   TSH 1.52 wnl  A1c 6.1  CRP 3.1 elevated  CMP wnl  CBC showed chronic microcytic anemia      MRI cervical spine 1/31/2019 at [facility] (imaging not available):  \"IMPRESSION:     Multilevel degenerative changes without significant canal stenosis.    Severe left foraminal narrowing at C3-C4.  Mild foraminal narrowing seen   at some levels, as detailed above.    Otherwise, unremarkable MRI cervical spine without contrast.\"     -----------------------------------------------------------------------------------------------------------------------------  ATTENDING ATTESTATION    I saw " patient with trainee and agree with the edits, history and exam that I helped formulate per above.    I personally spent 60 minutes on the day of the visit completing the review of the medical record and outside records, obtaining history and performing an appropriate physical exam, patient care, counseling and education, independently reviewing results, communicating with the patient, coordinating care.    Adeola Angulo MD  Neuromuscular Neurology  [facility]  Office Phone Number: [phone]

## 2024-11-20 NOTE — PATIENT INSTRUCTIONS
We will get some lab work today  We will get an autonomic testing to evaluate small fiber neuropathy  After the autonomic testing, you can start taking gabapentin 100 mg 3 times daily  Follow up after autonomic testing

## 2024-11-27 ENCOUNTER — APPOINTMENT (OUTPATIENT)
Dept: NEUROLOGY | Facility: HOSPITAL | Age: 49
End: 2024-11-27
Payer: COMMERCIAL

## 2024-12-10 ENCOUNTER — LAB (OUTPATIENT)
Dept: LAB | Facility: LAB | Age: 49
End: 2024-12-10
Payer: COMMERCIAL

## 2024-12-10 DIAGNOSIS — R20.2 PARESTHESIAS: ICD-10-CM

## 2024-12-10 DIAGNOSIS — G62.9 SMALL FIBER NEUROPATHY: ICD-10-CM

## 2024-12-10 LAB
FOLATE SERPL-MCNC: 18.6 NG/ML
GLIADIN PEPTIDE IGA SER IA-ACNC: <1 U/ML
HBV CORE IGM SER QL: NONREACTIVE
HBV SURFACE AB SER-ACNC: <3.1 MIU/ML
HBV SURFACE AG SERPL QL IA: NONREACTIVE
HCV AB SER QL: NONREACTIVE
HIV 1+2 AB+HIV1 P24 AG SERPL QL IA: NONREACTIVE
PROT SERPL-MCNC: 7.3 G/DL (ref 6.4–8.2)
TREPONEMA PALLIDUM IGG+IGM AB [PRESENCE] IN SERUM OR PLASMA BY IMMUNOASSAY: NONREACTIVE
TTG IGA SER IA-ACNC: <1 U/ML

## 2024-12-10 PROCEDURE — 86705 HEP B CORE ANTIBODY IGM: CPT

## 2024-12-10 PROCEDURE — 83521 IG LIGHT CHAINS FREE EACH: CPT

## 2024-12-10 PROCEDURE — 86038 ANTINUCLEAR ANTIBODIES: CPT

## 2024-12-10 PROCEDURE — 82595 ASSAY OF CRYOGLOBULIN: CPT

## 2024-12-10 PROCEDURE — 36415 COLL VENOUS BLD VENIPUNCTURE: CPT

## 2024-12-10 PROCEDURE — 86255 FLUORESCENT ANTIBODY SCREEN: CPT

## 2024-12-10 PROCEDURE — 84207 ASSAY OF VITAMIN B-6: CPT

## 2024-12-10 PROCEDURE — 84165 PROTEIN E-PHORESIS SERUM: CPT

## 2024-12-10 PROCEDURE — 84155 ASSAY OF PROTEIN SERUM: CPT

## 2024-12-10 PROCEDURE — 86334 IMMUNOFIX E-PHORESIS SERUM: CPT

## 2024-12-10 PROCEDURE — 86320 SERUM IMMUNOELECTROPHORESIS: CPT | Performed by: PSYCHIATRY & NEUROLOGY

## 2024-12-10 PROCEDURE — 87340 HEPATITIS B SURFACE AG IA: CPT

## 2024-12-10 PROCEDURE — 86225 DNA ANTIBODY NATIVE: CPT

## 2024-12-10 PROCEDURE — 83516 IMMUNOASSAY NONANTIBODY: CPT

## 2024-12-10 PROCEDURE — 86329 IMMUNODIFFUSION NES: CPT

## 2024-12-10 PROCEDURE — 82657 ENZYME CELL ACTIVITY: CPT

## 2024-12-10 PROCEDURE — 87389 HIV-1 AG W/HIV-1&-2 AB AG IA: CPT

## 2024-12-10 PROCEDURE — 83519 RIA NONANTIBODY: CPT

## 2024-12-10 PROCEDURE — 86780 TREPONEMA PALLIDUM: CPT

## 2024-12-10 PROCEDURE — 83825 ASSAY OF MERCURY: CPT

## 2024-12-10 PROCEDURE — 82746 ASSAY OF FOLIC ACID SERUM: CPT

## 2024-12-10 PROCEDURE — 84165 PROTEIN E-PHORESIS SERUM: CPT | Performed by: PSYCHIATRY & NEUROLOGY

## 2024-12-10 PROCEDURE — 86706 HEP B SURFACE ANTIBODY: CPT

## 2024-12-10 PROCEDURE — 86803 HEPATITIS C AB TEST: CPT

## 2024-12-10 PROCEDURE — 86235 NUCLEAR ANTIGEN ANTIBODY: CPT

## 2024-12-11 LAB
ANA SER QL HEP2 SUBST: NEGATIVE
KAPPA LC SERPL-MCNC: 1.98 MG/DL (ref 0.33–1.94)
KAPPA LC/LAMBDA SER: 1.06 {RATIO} (ref 0.26–1.65)
LAMBDA LC SERPL-MCNC: 1.86 MG/DL (ref 0.57–2.63)

## 2024-12-12 LAB
ARSENIC BLD-MCNC: <10 UG/L
CENTROMERE B AB SER-ACNC: <0.2 AI
CHROMATIN AB SERPL-ACNC: <0.2 AI
DSDNA AB SER-ACNC: <1 IU/ML
ENA JO1 AB SER QL IA: <0.2 AI
ENA RNP AB SER IA-ACNC: <0.2 AI
ENA SCL70 AB SER QL IA: <0.2 AI
ENA SM AB SER IA-ACNC: <0.2 AI
ENA SM+RNP AB SER QL IA: <0.2 AI
ENA SS-A AB SER IA-ACNC: <0.2 AI
ENA SS-B AB SER IA-ACNC: <0.2 AI
GLIADIN PEPTIDE IGG SER IA-ACNC: <0.56 FLU (ref 0–4.99)
LEAD BLDV-MCNC: <2 UG/DL
MERCURY BLD-MCNC: 3.4 UG/L
RIBOSOMAL P AB SER-ACNC: <0.2 AI
TTG IGG SER IA-ACNC: <0.82 FLU (ref 0–4.99)

## 2024-12-13 LAB
A-GALACTOSIDASE A SERPL-CCNC: 0.12 U/L (ref 0.07–0.46)
CLINICAL BIOCHEMIST REVIEW: NORMAL
PROVIDER SIGNING NAME: NORMAL
PYRIDOXAL PHOS SERPL-SCNC: 47.4 NMOL/L (ref 20–125)

## 2024-12-15 LAB
ALBUMIN: 4.3 G/DL (ref 3.4–5)
ALPHA 1 GLOBULIN: 0.2 G/DL (ref 0.2–0.6)
ALPHA 2 GLOBULIN: 0.6 G/DL (ref 0.4–1.1)
BETA GLOBULIN: 0.9 G/DL (ref 0.5–1.2)
GAMMA GLOBULIN: 1.2 G/DL (ref 0.5–1.4)
IMMUNOFIXATION COMMENT: NORMAL
PATH REVIEW - SERUM IMMUNOFIXATION: NORMAL
PATH REVIEW-SERUM PROTEIN ELECTROPHORESIS: NORMAL
PROTEIN ELECTROPHORESIS COMMENT: NORMAL

## 2024-12-26 LAB
AMPHIPHYSIN IGG SER QL IA: NEGATIVE
ANNOTATION COMMENT IMP: ABNORMAL
CASPR2-IGG CBA, S(MAYO): NEGATIVE
CV2 AB SERPL QL IF: NEGATIVE
GLIAL NUC TYPE 1 AB SER QL IF: NEGATIVE
HU1 AB SER QL: NEGATIVE
HU2 AB SER QL IF: NEGATIVE
HU3 AB SER QL: NEGATIVE
LGI1-IGG CBA, S(MAYO): NEGATIVE
PARANEOPLASTIC AB SER-IMP: ABNORMAL
PCA-1 AB SER QL IF: NEGATIVE
PCA-2 AB SER QL IF: NEGATIVE
PCA-TR AB SER QL IF: NEGATIVE
VGCC-P/Q BIND IGG+IGM SER IA-SCNC: 0 NMOL/L
VGKC IGG+IGM SER IA-SCNC: 0.04 NMOL/L

## 2024-12-30 LAB — CRYOGLOBULIN  (SJC): NORMAL

## 2025-01-29 ENCOUNTER — HOSPITAL ENCOUNTER (OUTPATIENT)
Dept: NEUROLOGY | Facility: HOSPITAL | Age: 50
Discharge: HOME | End: 2025-01-29
Payer: COMMERCIAL

## 2025-01-29 DIAGNOSIS — G62.9 SMALL FIBER NEUROPATHY: ICD-10-CM

## 2025-01-29 DIAGNOSIS — R20.2 PARESTHESIAS: ICD-10-CM

## 2025-01-29 PROCEDURE — 95923 AUTONOMIC NRV SYST FUNJ TEST: CPT | Performed by: STUDENT IN AN ORGANIZED HEALTH CARE EDUCATION/TRAINING PROGRAM

## 2025-01-29 PROCEDURE — 95924 ANS PARASYMP & SYMP W/TILT: CPT | Performed by: STUDENT IN AN ORGANIZED HEALTH CARE EDUCATION/TRAINING PROGRAM

## 2025-04-30 ENCOUNTER — OFFICE VISIT (OUTPATIENT)
Dept: OPHTHALMOLOGY | Facility: CLINIC | Age: 50
End: 2025-04-30
Payer: COMMERCIAL

## 2025-04-30 ENCOUNTER — OFFICE VISIT (OUTPATIENT)
Dept: NEUROLOGY | Facility: HOSPITAL | Age: 50
End: 2025-04-30
Payer: COMMERCIAL

## 2025-04-30 ENCOUNTER — LAB (OUTPATIENT)
Dept: LAB | Facility: HOSPITAL | Age: 50
End: 2025-04-30
Payer: COMMERCIAL

## 2025-04-30 VITALS
DIASTOLIC BLOOD PRESSURE: 110 MMHG | HEIGHT: 71 IN | TEMPERATURE: 97.1 F | HEART RATE: 107 BPM | WEIGHT: 230 LBS | BODY MASS INDEX: 32.2 KG/M2 | SYSTOLIC BLOOD PRESSURE: 160 MMHG | RESPIRATION RATE: 18 BRPM

## 2025-04-30 DIAGNOSIS — G62.9 SMALL FIBER NEUROPATHY: ICD-10-CM

## 2025-04-30 DIAGNOSIS — G62.9 POLYNEUROPATHY, UNSPECIFIED: Primary | ICD-10-CM

## 2025-04-30 DIAGNOSIS — H52.203 HYPEROPIA OF BOTH EYES WITH ASTIGMATISM AND PRESBYOPIA: Primary | ICD-10-CM

## 2025-04-30 DIAGNOSIS — H52.03 HYPEROPIA OF BOTH EYES WITH ASTIGMATISM AND PRESBYOPIA: Primary | ICD-10-CM

## 2025-04-30 DIAGNOSIS — R20.2 PARESTHESIAS: ICD-10-CM

## 2025-04-30 DIAGNOSIS — R21 RASH: Primary | ICD-10-CM

## 2025-04-30 DIAGNOSIS — H52.4 HYPEROPIA OF BOTH EYES WITH ASTIGMATISM AND PRESBYOPIA: Primary | ICD-10-CM

## 2025-04-30 PROCEDURE — 99213 OFFICE O/P EST LOW 20 MIN: CPT | Performed by: PSYCHIATRY & NEUROLOGY

## 2025-04-30 PROCEDURE — 92004 COMPRE OPH EXAM NEW PT 1/>: CPT | Performed by: OPTOMETRIST

## 2025-04-30 PROCEDURE — 1036F TOBACCO NON-USER: CPT | Performed by: PSYCHIATRY & NEUROLOGY

## 2025-04-30 PROCEDURE — 3080F DIAST BP >= 90 MM HG: CPT | Performed by: PSYCHIATRY & NEUROLOGY

## 2025-04-30 PROCEDURE — 92015 DETERMINE REFRACTIVE STATE: CPT | Performed by: OPTOMETRIST

## 2025-04-30 PROCEDURE — 3008F BODY MASS INDEX DOCD: CPT | Performed by: PSYCHIATRY & NEUROLOGY

## 2025-04-30 PROCEDURE — 3077F SYST BP >= 140 MM HG: CPT | Performed by: PSYCHIATRY & NEUROLOGY

## 2025-04-30 PROCEDURE — 99213 OFFICE O/P EST LOW 20 MIN: CPT | Mod: GC | Performed by: PSYCHIATRY & NEUROLOGY

## 2025-04-30 RX ORDER — GABAPENTIN 100 MG/1
600 CAPSULE ORAL 3 TIMES DAILY
Qty: 540 CAPSULE | Refills: 11 | Status: SHIPPED | OUTPATIENT
Start: 2025-04-30 | End: 2026-04-30

## 2025-04-30 ASSESSMENT — TONOMETRY
IOP_METHOD: GOLDMANN APPLANATION
OD_IOP_MMHG: 18
OS_IOP_MMHG: 18

## 2025-04-30 ASSESSMENT — CONF VISUAL FIELD
OS_SUPERIOR_NASAL_RESTRICTION: 0
OS_INFERIOR_TEMPORAL_RESTRICTION: 0
OS_NORMAL: 1
OD_INFERIOR_NASAL_RESTRICTION: 0
OD_INFERIOR_TEMPORAL_RESTRICTION: 0
OD_SUPERIOR_NASAL_RESTRICTION: 0
OS_SUPERIOR_TEMPORAL_RESTRICTION: 0
OD_NORMAL: 1
OD_SUPERIOR_TEMPORAL_RESTRICTION: 0
OS_INFERIOR_NASAL_RESTRICTION: 0

## 2025-04-30 ASSESSMENT — REFRACTION_MANIFEST
OD_SPHERE: +0.25
OD_CYLINDER: -0.75
OD_CYLINDER: -1.00
OS_AXIS: 095
OS_CYLINDER: -1.00
METHOD_AUTOREFRACTION: 1
OD_ADD: +2.00
OD_AXIS: 090
OS_SPHERE: +0.25
OS_SPHERE: +0.25
OD_SPHERE: +0.25
OS_ADD: +2.00
OD_AXIS: 090
OS_AXIS: 095
OS_CYLINDER: -1.00

## 2025-04-30 ASSESSMENT — VISUAL ACUITY
OS_SC: 20/30
OS_SC+: +2
METHOD: SNELLEN - LINEAR
OD_SC+: -1
OD_SC: 20/25

## 2025-04-30 ASSESSMENT — PAIN SCALES - GENERAL: PAINLEVEL_OUTOF10: 0-NO PAIN

## 2025-04-30 ASSESSMENT — EXTERNAL EXAM - LEFT EYE: OS_EXAM: NORMAL

## 2025-04-30 ASSESSMENT — EXTERNAL EXAM - RIGHT EYE: OD_EXAM: NORMAL

## 2025-04-30 ASSESSMENT — SLIT LAMP EXAM - LIDS
COMMENTS: NORMAL
COMMENTS: NORMAL

## 2025-04-30 ASSESSMENT — CUP TO DISC RATIO
OS_RATIO: 0.3
OD_RATIO: 0.3

## 2025-04-30 NOTE — PATIENT INSTRUCTIONS
- increase gabapentin up to 600mg 3 times a day, if you endorse side effects, please go to the prior tolerated dose  - skin biopsy for small fiber neuropathy and amyloidosis (this will be done in neurology clinic)   - refer to dermatology for skin lesion and we would like to see their opinion about its potential relation to a neurological condition  - we will send TTR genetic testing, this will be a kit mailed to you  - we will order blood work to look for rare autoimmune conditions causing small fiber neuropathy, you can get it drawn at any  labs

## 2025-04-30 NOTE — PROGRESS NOTES
"Neuromuscular Medicine Consult     Meme Muniz, MRN: [id number], : 1975  Reason for Referral: No chief complaint on file.     Referring Provider: No ref. provider found  Primary Care Physician: Neftaly Vanessa MD     Impression/Plan:   Meme Muniz is a 50 yo male with recently found prediabetes, who presents for follow up for subacute onset ascending burning pain and paresthesia from feet up to neck over 5 months. The abnormal sensation is persistent in his legs below the knees as well as the neck, and intermittent in the rest of the body. Sometimes he had transient \"frostbite\" sensation in the right face.  Around the time when the burning started, he noticed erectile dysfunction. No other symptoms suggesting dysautonomia. No significant sicca symptoms. He does not drink alcohol regularly, never smoked, no known toxic exposure, no known cardiac issue. His initial exam was notable for decreased temperature sensation in the left hand in a glove distribution, and decreased temperature sensation in bilateral feet below ankles. Reflexes and vibration sensation were normal.      Today, he reports worsening of his sensory symptoms, now has burning sensation throughout his body, though not in the mouth or in the scalp. No weakness. He may noticed some delayed sweating compared to the past. No other new symptoms.     His presentation and exam findings can best localize to small fibers in the peripheral nervous system. Possible causes of small fiber neuropathy include diabetes/glucose intolerance, vitamin excess (eg, B6) or deficiency (eg, B12), thyroid dysfunction, infection (eg, HIV, HBV, syphillis), autoimmune/inflammatory conditions (eg, sjogren's disease, sarcoidosis, etc), celiac disease, paraneoplastic conditions (eg, anti Hu), amyloidosis, cryoglobulinemia. Genetic conditions would be less likely in his case.  His current testing showed good B12 level and normal TSH, A1c is mildly elevated (6.1). His " prior CRP was elevated but not sure in what kind of health condition. His prior EMG showed no large fiber neuropathy, which would be within our expectation in cases of small fiber neuropathy. His prior MRI cervical spine at [facility] showed some degenerative changes with C3-C4 foraminal narrowing, we have low suspicion that his symptom would be a manifestation of cord involvement currently.     We obtained more labs to evaluate the above mentioned differential diagnoses. Paraneoplastic panel showed weakly positive V-G K+ dami ab, but CASPR2-IgG and LGI1-IgG were negative. Folate, heavy metal, alpha galactosidase, celiac ab panel, cryoglobilin, Hep B and Hep C serologies, HIV 1/2 antigen/antibody, syphilis screen, KATARZYNA + QUITA, SPEP + JENNY, vitamin B6 were all normal. Kappa light chain was slightly elevated but kappa/lambda ratio was normal. Autonomic testing was obtained and showed evidence suggestive of POTS on tilt table test (though he does not have significant POTS symptoms in general), QSART was normal.     Given the fast progression of his symptoms, it is essential to continue to look for the cause of his symptoms. Will obtain skin biopsy to evaluate small fiber neuropathy and amyloidosis. We will send genetic testing for TTR since the phenotypes of TTR neuropathy varies and could just affect small fibers. We will send small fiber neuropathy antibody panel (including FGFR3 Ab and TS-HDS ab). Will increase the dose of gabapentin. In addition, he has new non-painful skin lesion in the extensor surface of bilateral hands that appeared within the last 6 months, worse on the left side. We are not sure if this is related to a neurological condition or not, will send him to dermatology for further evaluation.        Impression: small fiber neuropathy    Plan:    - skin biopsy for small fiber neuropathy and amyloidosis  - small fiber neuropathy antibody panel (including FGFR3 Ab and TS-HDS ab)  - send TTR genetic testing  -  increase gabapentin up to 600mg TID.   - refer to dermatology for evaluation of skin lesion in the extensor surface.   - we will call the patient to schedule follow up appointment.         LUIS Day  Neuromuscular Fellow      History of Present Illness:    Mr. Muniz is a 50 y.o. male who presents for evaluation of burning sensation in the body. He presents alone today.     Interval history:   He reports symptoms are worsening. He now has burning sensation everywhere, throughout the body (though not inside of the mouth or in the scalp), all the time, every day. Some days are better than others. He thinks it gets worse when he lays down. No weakness. He thinks his sweating is probably a little bit delayed (delayed for about 10 min when he is working out in the gym). No sicca symptoms. No other dysautonomia symptoms except for the previously reported erectile dysfunction.     He takes gabapentin 100 mg TID, sometimes doubles the dose or even higher (went up to 500-600mg), not effective. He has tiredness that comes and goes, not frequent, he is not sure if this is from gabapentin or not.     Autonomic testing showed POTS on tilt table test. QSART was normal. The extensive lab work we obtained did not reveal a causitive issue.       Prior history (from initial visit 11/20/2024):  Around June 2024, he started to have burning sensation in feet bilaterally at the same time, it keeps ascending, in the stocking distribution, it progressed to his buttocks over several months. Then it progressed to his hands, ascending to his neck now. Shortly after the onset of burning, he noticed numbness in the same area. The abnormal sensation stays in his legs and neck all the time, comes and goes in the rest part of his body. He has intermittent burning sensation in his chest and back as well. Yesterday he noticed burning/pins and needles in the right side of his head that lasted for a short period of time. The burning sensation  sometimes fees like a frostbite with tingling feeling, very debilitating for him. He has stopped working out due to the pain. No weakness. No balance problem. No bulbar symptom. No ocular symptom. No difficulty with breathing.     Around the time when he started having burning sensation, he noticed erectile dysfunction. No orthostatic lightheadedness. No dry eyes, dry mouth or dry skin. Sweating is normal. No nausea/vomiting after eat. No diarrhea or constipation. No urinary urgency, frequency, incontinence of retention. No muscle pain, joint pain.     He drinks occasionally, one beer a time, last drink was 7 months ago. He was found to have elevated A1c 8 months ago, was 6.3, decreased to 6.1 5 months ago. He never smoked.     He eats everything but tries to avoid sugars. He eats a lot of protein, eats fruits and vegetables. He does not drink energy drinks or protein shakes but takes supplements for workout (he is not sure about the name, thinks it contains testosterone). He stopped all the supplements for 2 weeks and it did not help with his symptoms.     He does not have known cardiac condition. His father had a bypass surgery at age of 77. No sudden cardiac death in family.     He has C3-4 foraminal stenosis, which was shown on MRI about 7 years ago at the [facility]. He does not have radicular pain from neck to arm. He went to chiropractor to have decompression manipulation since he thought this could be the cause, no benefit.  He tried duloxetine for 30 days, had nausea, dizziness, palpitation and diaphoresis from it, so he stopped this.  He has not tried other medication for pain.       Relevant past medical, surgical, family, and social histories, along with ROS was reviewed and pertinent details noted above.     Past Medical History:   Diagnosis Date    Abnormal findings on diagnostic imaging of other specified body structures     Abnormal CT scan    Localized swelling, mass and lump, neck 11/08/2016  "   Mass of left side of neck    Personal history of other diseases of the digestive system 2015    History of acute cholecystitis    Personal history of other diseases of the digestive system 10/31/2016    History of gastroesophageal reflux (GERD)    Personal history of other diseases of the digestive system 2015    History of chronic cholecystitis     Past Surgical History:   Procedure Laterality Date    CHOLECYSTECTOMY  2016    Cholecystectomy Laparoscopic    EXCISION TUMOR NECK / THORAX  2018    mass excised from right side of neck     Family History   Problem Relation Name Age of Onset    Heart attack Father     Grand mother  of ovarian cancer.       Social History     Tobacco Use    Smoking status: Never     Passive exposure: Never    Smokeless tobacco: Never   Substance Use Topics    Alcohol use: Not Currently      No Known Allergies     Medications:    Current Outpatient Medications:     cyclobenzaprine (Flexeril) 5 mg tablet, Take 1 tablet (5 mg) by mouth 4 times a day as needed., Disp: , Rfl:     DULoxetine (Cymbalta) 30 mg DR capsule, Take 1 capsule (30 mg) by mouth once daily. Do not crush or chew., Disp: 30 capsule, Rfl: 11    pantoprazole (ProtoNix) 40 mg EC tablet, Take 1 tablet (40 mg) by mouth once daily., Disp: , Rfl:     gabapentin (Neurontin) 100 mg capsule, Take 6 capsules (600 mg) by mouth 3 times a day., Disp: 540 capsule, Rfl: 11       Physical Exam:   BP (!) 160/110   Pulse 107   Temp 36.2 °C (97.1 °F)   Resp 18   Ht 1.803 m (5' 11\")   Wt 104 kg (230 lb)   BMI 32.08 kg/m²      General Appearance:  No distress, alert, interactive and cooperative.   Skin: There is new skin lesions on the extensor surface of the fingers on both sides, with skin thickness (slightly raised) and dryness, worse on the left side. No tenderness or itchiness.   Musculoskeletal:  No scoliosis, lordosis, kyphosis, pes cavus, or hammertoes    Neurological:  Mental status: the patient provided " an accurate history, language was normal.   Cranial Nerves:  CN 2   Visual fields full to confrontation.   CN 3, 4, 6   Pupils round, 3 mm in diameter, equally reactive to light.   Lids symmetric; no ptosis.   EOMs normal alignment, full range, with normal pursuit and convergence  No nystagmus.   CN 5   Facial sensation intact bilaterally.   Jaw opening 5/5   CN 7   Normal and symmetric facial strength. Nasolabial folds symmetric.   Able to lift eyebrows and close eye lids with eye lashes buried symmetrically.   CN 8   Hearing intact to conversation.     CN 9, 10   Palate elevates symmetrically.   Phonation within normal limits, no dysarthria.   CN 11   Normal strength of shoulder shrug and neck turning.   CN 12   Tongue midline, with normal bulk and strength; no fasciculations.     Motor:   Muscle bulk: Normal throughout.  Muscle tone: Normal in both upper and lower extremities.  Movements: No fasciculations, tremors, or other abnormal movement.     Manual Muscle Testing (MMT) reveals the following MRC grades:  Neck Flexion 5  Neck Extension 5  R L   Shoulder abduction  5 5  Elbow flexion   5 5  Elbow extension  5 5  Finger flexion   5 5  Finger extension  5 5  Finger abduction  5 5  Thumb abduction   5 5  Hip flexion   5 5  Hip extension   5 5  Hip abduction    5 5  Hip adduction    5 5  Knee flexion   5 5  Knee extension  5 5  Ankle dorsiflexion  5 5  Ankle plantarflexion  5 5  Big toe extension  5 5  Toe flexion   5 5    Reflexes:     R          L  BR:               2          2  Biceps:         2          2  Triceps:        2          2  Knee:           3          3  Ankle:          1          1     Babinski: Toes are down going  Duncan's: Not present  No clonus or other pathological reflexes    Sensory:   Reports burning sensation throughout.   The sensation to touch, temperature, pinprick is the same among face, upper and lower extremities, and trunk. Normal vibration sensation in toes, ankles, knees and  "fingers.     Coordination:    In both upper extremities, finger-nose-finger was intact without dysmetria or overshoot.   JESSICA were intact in both upper and lower extremities.      Gait:   Normal casual gait.        Results:     The following labs, imaging, and/or data were personally reviewed and demonstrated:     Autonomic test 1/29/2025:   Reviewed personally. QSART is normal. Tilt table showed sustained increase of HR >30 within 10 min after tilt, consistent with POTS.     Labs in December 2024 since the initial consult:   Paraneoplastic ab weekly positie for V-G K+ channel,  but CASPR2-IgG negative  LGI1-IgG negative  Folate normal  Heavy metal negative  Alpha galactosidase negative  Celiac ab negative  Cryoglobulin negative  Hep B and hep C serologies normal  HIV 1/2 antigen/antibody negative  Syphilis screen negative  KATARZYNA+QUITA panel negative  SPEP + JENNY negative  Kappa/lambda light chain: elevated kappa but kappa/lambda ratio is normal  Vitamin B6 wnl      EMG 6/24/2024: reviewed, normal study.     Lab 6/6/2024:   B12 1312   TSH 1.52 wnl  A1c 6.1  CRP 3.1 elevated  CMP wnl  CBC showed chronic microcytic anemia      MRI cervical spine 1/31/2019 at [facility] (imaging not available):  \"IMPRESSION:     Multilevel degenerative changes without significant canal stenosis.    Severe left foraminal narrowing at C3-C4.  Mild foraminal narrowing seen   at some levels, as detailed above.    Otherwise, unremarkable MRI cervical spine without contrast.\"     -----------------------------------------------------------------------------------------------------------------------------  ATTENDING ATTESTATION    I saw patient with trainee and agree with the edits, history and exam that I helped formulate per above.    I personally spent 25 minutes on the day of the visit completing the review of the medical record and outside records, obtaining history and performing an appropriate physical exam, patient care, counseling and education, " independently reviewing results, communicating with the patient and other providers, coordinating care and performing appropriate clinical documentation.    Adeola Angulo MD  Neuromuscular Neurology  [facility]  Office Phone Number: [phone]

## 2025-04-30 NOTE — ASSESSMENT & PLAN NOTE
Mild Rx. No prior Rx wear. Anterior and posterior ocular health WNL OU.  Pt educated on findings. Release Rx for full time wear. Discussed adaptation. Recommend progressive lenses. Monitor annually. Pt voiced understanding.

## 2025-04-30 NOTE — PROGRESS NOTES
Assessment/Plan   Problem List Items Addressed This Visit          Eye/Vision problems    Hyperopia of both eyes with astigmatism and presbyopia - Primary    Mild Rx. No prior Rx wear. Anterior and posterior ocular health WNL OU.  Pt educated on findings. Release Rx for full time wear. Discussed adaptation. Recommend progressive lenses. Monitor annually. Pt voiced understanding.

## 2025-05-28 LAB — SCAN RESULT: NORMAL

## 2025-06-03 ENCOUNTER — TELEPHONE (OUTPATIENT)
Dept: NEUROLOGY | Facility: HOSPITAL | Age: 50
End: 2025-06-03
Payer: COMMERCIAL

## 2025-06-03 NOTE — TELEPHONE ENCOUNTER
I called the patient to talk about the lab results and the next steps.     He would have skin biopsy for evaluation of small fiber neuropathy on 6/9. He will try to collect sample for TTR genetic testing this week. If the above testing are negative, we will consider doing lip biopsy for evaluation of sjogren's syndrome since patients with Sjogren's can have neurological symptoms before the development of other symptoms, and small fiber neuropathy is one of the features of sjogren's disease.     He is taking gabapentin 600mg in the morning and 600mg in the evening. He is experiencing some sleepiness, intermittent. He knows that he has the room to go up on the dose but he is hesitant due to the side effect. I will discuss with Dr Angulo to see if we would try some other symptomatic management.       LUIS Day  Neuromuscular Fellow

## 2025-06-09 ENCOUNTER — APPOINTMENT (OUTPATIENT)
Dept: NEUROLOGY | Facility: CLINIC | Age: 50
End: 2025-06-09
Payer: COMMERCIAL

## 2025-07-02 NOTE — PROGRESS NOTES
"       [facility]  [facility]  [address]  [facility] [id number] [phone]     [facility]  New Visit Note      Subjective   Patient ID: Meme Muniz is a 50 y.o. male with past medical history significant for Obesity, Hypertension, Allergic Rhinitis, Gastroesophageal reflux disease, and OBSTRUCTIVE SLEEP APNEA.    2025: The patient is here {pxarrival:67865} and presents for follow-up {EWfollow-up reasons:16882}. His ESS is   today      SLEEP STUDY HISTORY: (personally reviewed raw data such as interpretation report, data sheet, hypnogram, and titration table if available and applicable)  ***    SLEEP-WAKE SCHEDULE  Bedtime: *** on weekdays, *** on weekends  Subjective sleep latency: ***  Problems falling asleep: ***  Number of awakenings: *** times per night spontaneously for unknown reasons  Falls back asleep in ***  Problems staying asleep: ***  Final wake time: *** on weekdays, *** on weekends  Out of bed time: *** on weekdays, *** on weekends  Shift work: ***  Naps: ***  Feels rested after a nap: {Yes/No:98478}  Average sleep duration (excluding naps): ***    SLEEP ENVIRONMENT  Sleep location: ***  Sleep status: {sleep status:48586}  Room is dark:  {Yes/No:95572::\"Yes\"}  Room is quiet: {Yes/No:34100::\"Yes\"}  Room is cool: {Yes/No:90257::\"Yes\"}  Bed comfort: good    SLEEP HABITS:   Activities before bedtime: ***  Activities in bed: ***  Preferred sleep position: {Sleep position:50761}    SLEEP ROS:  Night symptoms: {sleep apnea ROS at night:76858}  Morning symptoms: {sleep apnea ROS in mornin}  Daytime symptoms {sleep apnea ROS at daytime:50547}  Hypersomnia / narcolepsy symptoms: {narcolepsy ROS:63583}  RLS symptoms: {RLS symptoms:44941}  Movements in sleep: {sleep movements:41652}  Parasomnia symptoms: {parasomnia ROS:40354}    REVIEW OF SYSTEMS: All other systems have been reviewed " and are negative.    PERTINENT SOCIAL HISTORY:  Occupation: employment status  Smoking: {Yes/No:92470}  ETOH: {Yes/No:48370}  Marijuana: {Yes/No:58999}  Caffeine: ***  Sleep aids: {Yes/No:98217}  Claustrophobia: {Yes/No:64870}    PERTINENT PAST SURGICAL HISTORY:  {surgical history pertinent in sleep:58027}    PERTINENT FAMILY HISTORY:  {family history in sleep:18502}    Active Problems, Allergy List, Medication List, and PMH/PSH/FH/Social Hx have been reviewed and reconciled in chart. No significant changes unless documented in the pertinent chart section. Updates made when necessary.     REVIEW OF SYSTEMS  All other systems have been reviewed and are negative.      ALLERGIES  Allergies[1]    MEDICATIONS  Current Medications[2]          Objective       Physical Exam  Constitutional: Awake, not in distress  Lungs: Clear to auscultation bilateral, no rales  Heart: Regular rate and rhythm, no murmurs  Skin: Warm, no rash  Neuro: No tremors, moves all extremities  Psych: alert and oriented to time, place, and person    Assessment/Plan   Meme Muniz is a 50 y.o. male presents today in [facility] with the following problems:    # SLEEP DISORDERED BREATHING:  -This is likely sleep apnea based on the the history and physical examination.   - Meme Aden not yet had a sleep study.  -Instruct patient to complete home/ in lab/ split night/ titration sleep study.  -HSAT is reasonable as patient likely has YAIMA based on history and exam and does not have any of the following comorbidities: CHF, neuromuscular weakness, hypoventilation, or significant COPD.  -We consider treatment as indicated when testing is complete.     # SLEEP APNEA:  -Per CMS criteria, the patient is not eligible for a pap to treat her Obstructive sleep apnea.   -Start/ Continue [] cmH20 []PAP through [employer].  -Sleep apnea and PAP therapy education were provided at length in the clinic today. Meme  Flavio verbalized understanding.  -Emphasized diet, exercise, and weight loss in the clinic, as were non-supine sleep, avoiding alcohol in the late evening, and driving or operating heavy machinery when sleepy.  Meme Munizverbalizes understanding of the above instructions and risks.    # BARIATRIC SURGERY INSTRUCTION:  -Bring your PAP and all equipment with you to surgery.  -Use your PAP with surgery and during recovery.  -Keep your head of the bed at 30* or higher.  -I advise judicious use of pain medications post operatively as pain medications can make sleep apnea worse.  -I advise close monitoring of the patient post operatively due to increased risk of complications related to sleep apnea.   -Meme Muniz may be at higher risk of postoperative respiratory complications.Meme Muniz understands the risk of post operative complications are higher for Meme Muniz is at increased but not prohibitive risk due to YAIMA.   -Meme Muniz is optimized on PAP therapy for sleep apnea as long as Meme Muniz is compliant with PAP use per most recent download.  -Meme Muniz verbalizes understanding of the above instructions and risks.     # CHRONIC SLEEP ONSET/ SLEEP MAINTENANCE INSOMNIA:  -likely due to poor sleep hygiene, irregular sleep schedule, depression, anxiety, untreated sleep apnea, nocturia, RLS, delayed sleep phase syndrome, and chronic pain. [Meds] may be a contributing factor as well.  -XIMENA:  -Sleep hygiene discuss in the clinic.    # DEPRESSION and ANXIETY:   -Meme Munizis taking [] and participating in psychotherapy.  -Denies HI/SI     # HYPERTENSION/ ATRIAL FIBRILLATION/ CAD/ CHF:  -Blood pressure was controlled today. To control the blood pressure better, instruct the patient to take anti-hypertension medication at bedtime and a water pill in the waking time.  -Denies headache, palpitation, and syncope in the clinic.  -Last Echo:  -Follows with PCP/  Cardiology     # OBESITY/OVERWEIGHT:  -with a BMI of []. Meme Muniz most recent Bicarbonate was [] in [].  -Encourage to have regular exercise to manage weight well.    # NASAL CONGESTION:  -Instruct Meme Munizcain use appropriate Flonase spray to ease congestion.  -Refer to Otolaryngology for underlying investigation.    # XEROSTOMIA:  -Set up the humidity level at 4/5 to ease his/her dry mouth symptoms.   -Instruct Meme Munizcain purchase the Biotene gel to ease the dry mouth symptom,     # TOBACCO ABUSE:Meme Muniz is a current [] PPD smoker for [] years  -Smoking Cessation given  -Decline Smoking Cessation     # RESTLESS LEG SYNDROME: This occurs frequently.  Last ferritin []. We will check a ferritin level today and start OTC iron replacement to ferritin of >75 as indicated.  Caffeine []. Eliminate  Tobacco []. Smoking cessation counseling provided.  Meme Munizmatthew I discussed Meme Munizcurrent medications that could be exacerbating Meme Muniz symptoms. Will continue [] for now.  Associated diseases [] and response [].  Pramipexole  Ropinirole  Rotigitine  Gabapentin  Pregabalin  Opioids  Benzos     # CIRCADIAN RHYTHM SLEEP-WAKE DISORDER:  -We will obtain sleep logs for two weeks to be brought to next clinic to discuss. We will consider actigraphy to compare and/or confirm sleep log findings.  Delayed Sleep Phase:   -Set wake time, light therapy in the morning.   -Sleep hygiene measures at set bedtime.  Advanced Sleep Phase:   -Set bedtime and light therapy in the evening.   -Sleep hygiene measures at set bedtime.  Irregular Sleep Phase:  -Set bedtime and wake time.   -Daytime routine including scheduled physical activity, social activity and meal times.  Non-24 Sleep-Wake Rhythm:  -Set bedtime and wake time.   -Daytime routine including scheduled physical activity, social activity and meal times.  Shift Work:  -Maximize sleep time to 7-8 hours.   -Make sure the room is dark, quiet,  cool and interruptions are eliminated.   -Avoid light in the mornings, wear dark sunglasses driving home.   -Expose self to bright light or daylight upon waking.   -Avoid caffeine 8 hours prior to sleeping.   Jet Lag:  -Try to change the sleep/wake schedule two to three days prior to travel.   -Expose self to bright light when you want to be awake.   -Avoid bright light and electronic light when you are nearing time to sleep.   -Take melatonin OTC as needed 1 hour prior to bedtime as needed.     # SLEEPWALKING/ SLEEP EATING/ REM BEHAVIOR DISORDER:  -Instruct Meme Muniz to make sure self and bed partner are safe.  -Instruct Meme Maier lock bedroom doors and windows.  -Instruct Meme Maier hide his/her car keys.  -Instruct Meme Maier pad headboard and move furniture away from the bed.  -Instruct Meme Muniz to put pillows in between him/her with bed partner if kicking or hitting. Consider sleeping separately.  -Instruct Meme Muniz to remove clutter and furniture from bedroom floor to avoid injury. Consider moving Valentine Flavio mattress to the floor.  -Instruct Meme Muniz to advise bed partner to calmly lead you back to bed with a gentle voice. Avoid touching and grabbing.  -Instruct Meme Muniz to  find evidence of sleep eating, especially if he/she is gaining weight, consider locking Valentine Flavio fridge and pantry at night.  -We will consider a trial of low dose clonazepam 0.5 mg nightly.     # IDIOPATHIC HYPERSOMNIA VS. NARCOLEPSY:  - Will order an overnight sleep study, followed by MSLT the next day  - Perform urine toxicology prior to sleep study.  - May consider checking TSH/ HLA/CBC/CMP/iron profile and iron studies to rule out underlying metabolic etiologies.  -Scheduled naps  -Consolidate night time sleep.  -Will discuss follow up plan after study completed.     *An OARRS report was reviewed and is consistent with prescribed medications.   *Considered the risks  of abuse, dependence, addiction, and diversion and [] medication is felt to be clinically appropriate based on documented diagnosis.  *A controlled substance agreement was reviewed and signed today in clinic.   *Pending scanned copy in to the chart per office staff.    RTC    All of patient's questions were answered. He verbalizes understanding and agreement with my assessment and plan.    Please excuse any errors in grammar or translation related to dictation.           [1] No Known Allergies  [2]   Current Outpatient Medications   Medication Sig Dispense Refill    cyclobenzaprine (Flexeril) 5 mg tablet Take 1 tablet (5 mg) by mouth 4 times a day as needed.      DULoxetine (Cymbalta) 30 mg DR capsule Take 1 capsule (30 mg) by mouth once daily. Do not crush or chew. 30 capsule 11    gabapentin (Neurontin) 100 mg capsule Take 6 capsules (600 mg) by mouth 3 times a day. 540 capsule 11    pantoprazole (ProtoNix) 40 mg EC tablet Take 1 tablet (40 mg) by mouth once daily.       No current facility-administered medications for this visit.

## 2025-07-07 ENCOUNTER — APPOINTMENT (OUTPATIENT)
Dept: SLEEP MEDICINE | Facility: HOSPITAL | Age: 50
End: 2025-07-07
Payer: COMMERCIAL

## 2025-07-08 ENCOUNTER — APPOINTMENT (OUTPATIENT)
Dept: DERMATOLOGY | Facility: CLINIC | Age: 50
End: 2025-07-08
Payer: COMMERCIAL

## 2025-07-09 ENCOUNTER — APPOINTMENT (OUTPATIENT)
Dept: NEUROLOGY | Facility: HOSPITAL | Age: 50
End: 2025-07-09
Payer: COMMERCIAL

## 2025-07-21 ENCOUNTER — PROCEDURE VISIT (OUTPATIENT)
Dept: NEUROLOGY | Facility: CLINIC | Age: 50
End: 2025-07-21
Payer: COMMERCIAL

## 2025-07-21 DIAGNOSIS — G62.9 SMALL FIBER NEUROPATHY: ICD-10-CM

## 2025-07-21 PROCEDURE — 11104 PUNCH BX SKIN SINGLE LESION: CPT | Performed by: PSYCHIATRY & NEUROLOGY

## 2025-07-21 PROCEDURE — 11105 PUNCH BX SKIN EA SEP/ADDL: CPT | Performed by: PSYCHIATRY & NEUROLOGY

## 2025-07-21 PROCEDURE — 2500000004 HC RX 250 GENERAL PHARMACY W/ HCPCS (ALT 636 FOR OP/ED): Performed by: PSYCHIATRY & NEUROLOGY

## 2025-07-21 RX ORDER — LIDOCAINE HYDROCHLORIDE AND EPINEPHRINE 10; 20 UG/ML; MG/ML
3 INJECTION, SOLUTION INFILTRATION; PERINEURAL ONCE
Status: COMPLETED | OUTPATIENT
Start: 2025-07-21 | End: 2025-07-21

## 2025-07-21 RX ADMIN — LIDOCAINE HYDROCHLORIDE,EPINEPHRINE BITARTRATE 3 ML: 20; .01 INJECTION, SOLUTION INFILTRATION; PERINEURAL at 16:31

## 2025-07-21 NOTE — PROGRESS NOTES
Skin Biopsy Procedure Note      Meme Muniz [id number], a patient referred by Debra, presented today for skin biopsy to quantify and compare epidermal nerve fiber density from 2 proximal sites (hip and distal thigh) with a distal site (ankle).   Indications: Symptoms of small fiber neuropathy   Potential risks were explained: Including but not limited to excessive bleeding, discomfort at injection site, and infection   Procedure: The patient was placed in the right lateral recumbent position. The skin was prepped with Chlorhexidine in 3 locations, all on the left side: 10 cm proximal to lateral malleolus, 7 cm proximal to the knee and 7 cm distal to the greater trochanter. Local anesthesia was achieved with xylocaine 2% with epinephrine. The 3 punch biopsies were then taken without difficulty, and placed in fixative solution. The biopsy sites were dried using sterile gauze. Band-aids were applied at all sites. The specimens were shipped via [employer] the same afternoon to Dr. Mike Restrepo at the [facility] in [place].  Complications: There were no immediate complications.   Results: Will be available in 21-30 days and will be scanned in the chart.   Referring Provider, Jennifer Richardson MD  [id number]  Department [place] [address] will be notified at that time via fax and/or in basket message.    Ranjit Park MD  Neuromuscular Fellow

## 2025-07-30 DIAGNOSIS — G62.9 NEUROPATHY: ICD-10-CM

## 2025-07-30 DIAGNOSIS — R20.2 PARESTHESIAS: Primary | ICD-10-CM

## 2025-08-11 ENCOUNTER — APPOINTMENT (OUTPATIENT)
Dept: PAIN MEDICINE | Facility: HOSPITAL | Age: 50
End: 2025-08-11
Payer: COMMERCIAL

## 2025-08-15 ENCOUNTER — HOSPITAL ENCOUNTER (EMERGENCY)
Facility: HOSPITAL | Age: 50
Discharge: HOME | End: 2025-08-15
Attending: EMERGENCY MEDICINE
Payer: COMMERCIAL

## 2025-08-15 VITALS
OXYGEN SATURATION: 97 % | WEIGHT: 235 LBS | HEIGHT: 70 IN | RESPIRATION RATE: 17 BRPM | HEART RATE: 98 BPM | TEMPERATURE: 98.8 F | SYSTOLIC BLOOD PRESSURE: 164 MMHG | BODY MASS INDEX: 33.64 KG/M2 | DIASTOLIC BLOOD PRESSURE: 99 MMHG

## 2025-08-15 DIAGNOSIS — I10 PRIMARY HYPERTENSION: Primary | ICD-10-CM

## 2025-08-15 PROCEDURE — 99283 EMERGENCY DEPT VISIT LOW MDM: CPT

## 2025-08-15 PROCEDURE — 99281 EMR DPT VST MAYX REQ PHY/QHP: CPT | Performed by: EMERGENCY MEDICINE

## 2025-08-15 RX ORDER — AMLODIPINE BESYLATE 5 MG/1
5 TABLET ORAL DAILY
Qty: 30 TABLET | Refills: 0 | Status: SHIPPED | OUTPATIENT
Start: 2025-08-15 | End: 2025-09-14

## 2025-08-15 RX ORDER — KETOROLAC TROMETHAMINE 10 MG/1
10 TABLET, FILM COATED ORAL EVERY 6 HOURS PRN
Qty: 12 TABLET | Refills: 0 | Status: SHIPPED | OUTPATIENT
Start: 2025-08-15 | End: 2025-08-18

## 2025-08-15 ASSESSMENT — PAIN SCALES - GENERAL
PAINLEVEL_OUTOF10: 7
PAINLEVEL_OUTOF10: 0 - NO PAIN

## 2025-08-15 ASSESSMENT — PAIN - FUNCTIONAL ASSESSMENT: PAIN_FUNCTIONAL_ASSESSMENT: 0-10

## 2025-08-15 ASSESSMENT — PAIN DESCRIPTION - PAIN TYPE: TYPE: ACUTE PAIN

## 2025-08-15 ASSESSMENT — PAIN DESCRIPTION - LOCATION: LOCATION: MOUTH

## 2025-08-18 ENCOUNTER — APPOINTMENT (OUTPATIENT)
Dept: PAIN MEDICINE | Facility: HOSPITAL | Age: 50
End: 2025-08-18
Payer: COMMERCIAL

## 2025-08-19 ENCOUNTER — TELEPHONE (OUTPATIENT)
Dept: NEUROLOGY | Facility: HOSPITAL | Age: 50
End: 2025-08-19
Payer: COMMERCIAL

## 2025-08-23 PROBLEM — G62.9 SMALL FIBER NEUROPATHY: Status: ACTIVE | Noted: 2025-08-23

## 2025-08-25 ENCOUNTER — APPOINTMENT (OUTPATIENT)
Dept: PRIMARY CARE | Facility: CLINIC | Age: 50
End: 2025-08-25
Payer: COMMERCIAL

## 2025-09-08 ENCOUNTER — APPOINTMENT (OUTPATIENT)
Dept: PAIN MEDICINE | Facility: HOSPITAL | Age: 50
End: 2025-09-08
Payer: COMMERCIAL

## 2025-09-15 ENCOUNTER — APPOINTMENT (OUTPATIENT)
Dept: SLEEP MEDICINE | Facility: HOSPITAL | Age: 50
End: 2025-09-15
Payer: COMMERCIAL

## 2025-12-16 ENCOUNTER — APPOINTMENT (OUTPATIENT)
Dept: NEUROLOGY | Facility: HOSPITAL | Age: 50
End: 2025-12-16
Payer: COMMERCIAL

## 2026-02-09 ENCOUNTER — APPOINTMENT (OUTPATIENT)
Dept: DERMATOLOGY | Facility: CLINIC | Age: 51
End: 2026-02-09
Payer: COMMERCIAL

## 2026-05-01 ENCOUNTER — APPOINTMENT (OUTPATIENT)
Dept: OPHTHALMOLOGY | Facility: CLINIC | Age: 51
End: 2026-05-01
Payer: COMMERCIAL